# Patient Record
Sex: FEMALE | Race: WHITE | NOT HISPANIC OR LATINO | Employment: FULL TIME | URBAN - METROPOLITAN AREA
[De-identification: names, ages, dates, MRNs, and addresses within clinical notes are randomized per-mention and may not be internally consistent; named-entity substitution may affect disease eponyms.]

---

## 2017-02-23 ENCOUNTER — GENERIC CONVERSION - ENCOUNTER (OUTPATIENT)
Dept: OTHER | Facility: OTHER | Age: 62
End: 2017-02-23

## 2017-05-10 ENCOUNTER — ALLSCRIPTS OFFICE VISIT (OUTPATIENT)
Dept: OTHER | Facility: OTHER | Age: 62
End: 2017-05-10

## 2017-05-10 DIAGNOSIS — R10.13 EPIGASTRIC PAIN: ICD-10-CM

## 2017-05-24 ENCOUNTER — HOSPITAL ENCOUNTER (OUTPATIENT)
Dept: ULTRASOUND IMAGING | Facility: HOSPITAL | Age: 62
Discharge: HOME | End: 2017-05-24
Payer: COMMERCIAL

## 2017-05-24 DIAGNOSIS — R10.13 EPIGASTRIC PAIN: ICD-10-CM

## 2017-05-24 PROCEDURE — 76705 ECHO EXAM OF ABDOMEN: CPT

## 2017-06-06 RX ORDER — FAMOTIDINE 40 MG/1
40 TABLET, FILM COATED ORAL DAILY
COMMUNITY
End: 2018-03-09 | Stop reason: SDUPTHER

## 2017-06-06 RX ORDER — TRAZODONE HYDROCHLORIDE 50 MG/1
50 TABLET ORAL
COMMUNITY
End: 2021-12-13

## 2017-06-07 ENCOUNTER — ANESTHESIA EVENT (OUTPATIENT)
Dept: PERIOP | Facility: HOSPITAL | Age: 62
End: 2017-06-07
Payer: COMMERCIAL

## 2017-06-08 ENCOUNTER — GENERIC CONVERSION - ENCOUNTER (OUTPATIENT)
Dept: OTHER | Facility: OTHER | Age: 62
End: 2017-06-08

## 2017-06-08 ENCOUNTER — ANESTHESIA (OUTPATIENT)
Dept: PERIOP | Facility: HOSPITAL | Age: 62
End: 2017-06-08
Payer: COMMERCIAL

## 2017-06-08 ENCOUNTER — HOSPITAL ENCOUNTER (OUTPATIENT)
Facility: HOSPITAL | Age: 62
Setting detail: OUTPATIENT SURGERY
Discharge: HOME | End: 2017-06-08
Attending: INTERNAL MEDICINE | Admitting: INTERNAL MEDICINE
Payer: COMMERCIAL

## 2017-06-08 VITALS
RESPIRATION RATE: 10 BRPM | OXYGEN SATURATION: 100 % | HEART RATE: 67 BPM | SYSTOLIC BLOOD PRESSURE: 110 MMHG | DIASTOLIC BLOOD PRESSURE: 73 MMHG | TEMPERATURE: 98 F | HEIGHT: 65 IN | BODY MASS INDEX: 23.32 KG/M2 | WEIGHT: 140 LBS

## 2017-06-08 DIAGNOSIS — R10.11 ABDOMINAL PAIN, RUQ: ICD-10-CM

## 2017-06-08 DIAGNOSIS — K21.00 GASTRO-ESOPHAGEAL REFLUX DISEASE WITH ESOPHAGITIS: ICD-10-CM

## 2017-06-08 DIAGNOSIS — R10.13 EPIGASTRIC PAIN: ICD-10-CM

## 2017-06-08 DIAGNOSIS — Z12.11 ENCOUNTER FOR SCREENING FOR MALIGNANT NEOPLASM OF COLON: ICD-10-CM

## 2017-06-08 PROCEDURE — 88342 IMHCHEM/IMCYTCHM 1ST ANTB: CPT | Performed by: INTERNAL MEDICINE

## 2017-06-08 PROCEDURE — 88305 TISSUE EXAM BY PATHOLOGIST: CPT | Performed by: INTERNAL MEDICINE

## 2017-06-08 RX ORDER — SODIUM CHLORIDE, SODIUM LACTATE, POTASSIUM CHLORIDE, CALCIUM CHLORIDE 600; 310; 30; 20 MG/100ML; MG/100ML; MG/100ML; MG/100ML
50 INJECTION, SOLUTION INTRAVENOUS CONTINUOUS
Status: DISCONTINUED | OUTPATIENT
Start: 2017-06-08 | End: 2017-06-08 | Stop reason: HOSPADM

## 2017-06-08 RX ORDER — PROPOFOL 10 MG/ML
INJECTION, EMULSION INTRAVENOUS AS NEEDED
Status: DISCONTINUED | OUTPATIENT
Start: 2017-06-08 | End: 2017-06-08 | Stop reason: SURG

## 2017-06-08 RX ORDER — LIDOCAINE HYDROCHLORIDE 10 MG/ML
INJECTION, SOLUTION INFILTRATION; PERINEURAL AS NEEDED
Status: DISCONTINUED | OUTPATIENT
Start: 2017-06-08 | End: 2017-06-08 | Stop reason: SURG

## 2017-06-08 RX ADMIN — PROPOFOL 50 MG: 10 INJECTION, EMULSION INTRAVENOUS at 08:23

## 2017-06-08 RX ADMIN — PROPOFOL 50 MG: 10 INJECTION, EMULSION INTRAVENOUS at 08:17

## 2017-06-08 RX ADMIN — PROPOFOL 100 MG: 10 INJECTION, EMULSION INTRAVENOUS at 08:08

## 2017-06-08 RX ADMIN — PROPOFOL 50 MG: 10 INJECTION, EMULSION INTRAVENOUS at 08:13

## 2017-06-08 RX ADMIN — SODIUM CHLORIDE, SODIUM LACTATE, POTASSIUM CHLORIDE, AND CALCIUM CHLORIDE 50 ML/HR: .6; .31; .03; .02 INJECTION, SOLUTION INTRAVENOUS at 06:57

## 2017-06-08 RX ADMIN — PROPOFOL 50 MG: 10 INJECTION, EMULSION INTRAVENOUS at 08:20

## 2017-06-08 RX ADMIN — LIDOCAINE HYDROCHLORIDE 50 MG: 10 INJECTION, SOLUTION INFILTRATION; PERINEURAL at 08:07

## 2018-01-12 VITALS
SYSTOLIC BLOOD PRESSURE: 122 MMHG | DIASTOLIC BLOOD PRESSURE: 80 MMHG | BODY MASS INDEX: 24.41 KG/M2 | HEIGHT: 64 IN | WEIGHT: 143 LBS

## 2018-01-12 NOTE — PROGRESS NOTES
Assessment    1  Encounter for other screening for malignant neoplasm of breast (V76 19) (Z12 39)   2  Hyperlipidemia (272 4) (E78 5)   3  Encounter for vitamin deficiency screening (V77 99) (Z13 21)   4  History of Influenza vaccination administered at current visit (V04 81) (Z23)   5  Esophagitis, reflux (530 11) (K21 0)   6  Insomnia (780 52) (G47 00)   7  Osteopenia (733 90) (M85 80)   8  Encounter for preventive health examination (V70 0) (Z00 00)    Plan  Encounter for other screening for malignant neoplasm of breast    · * MAMMO SCREENING BILATERAL W CAD; Status:Active - Retrospective By Protocol  Authorization; Requested for:23Lua4310;   Encounter for vitamin deficiency screening, Osteopenia    · (1) VITAMIN D 25-HYDROXY; Status:Active - Retrospective Authorization; Requested  for:23Guz2704;   Epigastric pain    · Gaviscon  MG/15ML Oral Suspension  Esophagitis, reflux    · Omeprazole 20 MG Oral Capsule Delayed Release  Fatigue    · Zolpidem Tartrate 5 MG Oral Tablet  Health Maintenance    · *VB-Depression Screening; Status:Active; Requested for:13Hte2516;    · Begin or continue regular aerobic exercise  Gradually work up to at least count1  sessions of dur1 of exercise a week ; Status:Complete;   Done: 09YUH9468 10:50PM   · Decreasing the stress in your life may help your condition improve ; Status:Complete;    Done: 54ZJF6057 10:50PM   · Regular aerobic exercise can help reduce stress ; Status:Complete;   Done: 99ISG0984  10:50PM  Hyperlipidemia    · (1) CBC/PLT/DIFF; Status:Active - Retrospective Authorization; Requested  for:82Tqk4208;    · (1) COMPREHENSIVE METABOLIC PANEL; Status:Active - Retrospective Authorization; Requested for:52Wqc4408;    · (1) LIPID PANEL, FASTING; Status:Active - Retrospective Authorization; Requested  for:57Lcb0266;   Hyperlipidemia, Insomnia    · (1) TSH; Status:Active - Retrospective Authorization; Requested for:52Jdb6978;    Insomnia    · TraZODone HCl - 50 MG Oral Tablet; TAKE 1 TABLET AT BEDTIME  Osteopenia, Screening for osteoporosis    · * DXA BONE DENSITY SPINE HIP AND PELVIS; Status:Active - Retrospective  Authorization; Requested for:95Vqv6192;   PMH: Influenza vaccination administered at current visit    · Fluzone Quadrivalent 0 5 ML Intramuscular Suspension Prefilled Syringe  Unlinked    · Co-Enzyme Q10 200 MG Oral Capsule   · Vitamin D3 400 UNIT Oral Capsule    Check BW, mammogram, Dexa scan  Rx for trazodone  Recheck 6 weeks  Discussion/Summary    1  Esophageal reflux-f/u with GI for EGD  2  insomnia-chronic-should have a sleep study  Will give trial of trazodone first  3  Depression-mild-may be related to her poor sleep  Will see how she responds  May need an SSRI  4  HM-shots up to date  Needs mammogram, Dexa scan, Gyn exam  Will be getting colonoscopy  History of Present Illness  HM, Adult Female: The patient is being seen for a health maintenance evaluation  General Health: The patient's health since the last visit is described as good  Lifestyle:  She consumes a diverse and healthy diet  (Eats fruits and vegetables  Eats fish, chicken  Avoids red meat  Does a Nutribullet with a spinach and fruits  )   She exercises regularly  (Walks the dogs  Very active at work)   She does not use tobacco  She consumes alcohol  She reports occasional alcohol use  Caffeine 3 daily  No soda or tea  Dietary calcium 1 daily  Screening: Additional History:   Gyn checkup in Jan    HPI: Namrata Moss says she is doing well overall  She notices some urinary incontinence  Seems mostly related to cough, sneeze  She has had bad reflux   Saw GI   See consult  EGD/colonoscopy recommended  Her sleep is poor and has been for years  She has trouble getting to sleep and staying asleep  She does snore  No history of apnea  Review of Systems    Over the past 2 weeks, how often have you been bothered by the following problems? 1 ) Little interest or pleasure in doing things? Not at all    2 ) Feeling down, depressed or hopeless? Not at all    3 ) Trouble falling asleep or sleeping too much? Nearly every day  4 ) Feeling tired or having little energy? Nearly every day    7 ) Trouble concentrating on things, such as reading a newspaper or watching television? Several days  8 ) Moving or speaking so slowly that other people could have noticed, or the opposite, moving or speaking faster than usual? Not at all    9 ) Thoughts that you would be better off dead or of hurting yourself in some way? Not at all  severity of depression is mild       Active Problems    1  Abdominal pain, RUQ (789 01) (R10 11)   2  Achrochordon (701 9) (L91 8)   3  Encounter for screening mammogram for malignant neoplasm of breast (V76 12)   (Z12 31)   4  Epigastric pain (789 06) (R10 13)   5  Esophagitis, reflux (530 11) (K21 0)   6  Fatigue (780 79) (R53 83)   7  Hyperlipidemia (272 4) (E78 5)   8  Insomnia (780 52) (G47 00)   9  Insomnia (780 52) (G47 00)   10  Lump of skin (782 2) (R22 9)   11  Osteopenia (733 90) (M85 80)   12  Palpitations (785 1) (R00 2)   13  Regurgitation (787 03) (R11 10)   14  Screening for colorectal cancer (V76 51) (Z12 11,Z12 12)   15  Screening for osteoporosis (V82 81) (Z13 820)   16  Screening for skin condition (V82 0) (Z13 89)   17  Skin rash (782 1) (R21)   18   Urinary incontinence (788 30) (R32)    Past Medical History    · History of Influenza vaccination administered at current visit (V04 81) (Z23)    Surgical History    · History of Breast Surgery Lumpectomy   · History of Colonoscopy (Fiberoptic)    Family History  Mother    · Family history of Family Health Status Of Mother - Alive   · Family history of Parkinson's disease (V17 2) (Z82 0)   · Family history of Hypertension (V17 49)  Father    · Family history of Diabetes Mellitus (V18 0)   · Family history of Family Health Status Of Father - Alive   · Family history of malignant neoplasm of urinary bladder (V16 52) (Z80 52)   · Family history of Hypertension (V17 49)    Social History    · Alcohol Use (History)   · Caffeine Use   · Never a smoker    Current Meds   1  Co-Enzyme Q10 200 MG Oral Capsule; Therapy: 59NCA0470 to Recorded   2  Famotidine 40 MG Oral Tablet; TAKE 1 TABLET AT BEDTIME; Therapy: 18Ixq2874 to (Evaluate:08Jan2017)  Requested for: 84Fwe3418; Last   Rx:31Yje6254 Ordered   3  Gaviscon  MG/15ML Oral Suspension; TAKE 10 ML 4 times daily; Therapy: 56Qsn8777 to (Papito Li)  Requested for: 21Apr2016; Last   Rx:87Yna3600 Ordered   4  Nighttime Sleep Aid 25 MG Oral Tablet; TAKE 1 TABLET AT BEDTIME Recorded   5  Omeprazole 20 MG Oral Capsule Delayed Release; TAKE ONE CAPSULE BY MOUTH   EVERY DAY; Therapy: 67VSC1165 to (Rayna Kothari)  Requested for: 90GUT9810; Last   GJ:93MNP7244 Ordered   6  Vitamin D3 400 UNIT Oral Capsule; Therapy: 77CKP2007 to Recorded   7  Zolpidem Tartrate 5 MG Oral Tablet; TAKE 1 TABLET AT  BEDTIME AS NEEDED FOR   INSOMNIA; Therapy: 92HLF0623 to (Evaluate:22Tpu4532); Last Rx:06Ivd1130 Ordered    Allergies    1  Penicillins    Vitals   Recorded: 99Dcz3629 09:30AM   Heart Rate 86   Respiration 16   Systolic 381   Diastolic 70   Height 5 ft 4 8 in   Weight 141 lb    BMI Calculated 23 61   BSA Calculated 1 71     Physical Exam    Constitutional   General appearance: No acute distress, well appearing and well nourished  Head and Face   Head and face: Normal     Eyes   Conjunctiva and lids: No swelling, erythema or discharge  Pupils and irises: Equal, round, reactive to light  Ears, Nose, Mouth, and Throat   Otoscopic examination: Tympanic membranes translucent with normal light reflex  Canals patent without erythema  Oropharynx: Normal with no erythema, edema, exudate or lesions  Neck   Neck: Supple, symmetric, trachea midline, no masses  Thyroid: Normal, no thyromegaly      Pulmonary   Respiratory effort: No increased work of breathing or signs of respiratory distress  Auscultation of lungs: Clear to auscultation  Cardiovascular   Auscultation of heart: Normal rate and rhythm, normal S1 and S2, no murmurs  Carotid pulses: 2+ bilaterally  Abdominal aorta: Normal     Femoral pulses: 2+ bilaterally  Pedal pulses: 2+ bilaterally  Peripheral vascular exam: Normal     Examination of extremities for edema and/or varicosities: Normal     Abdomen   Abdomen: Non-tender, no masses  Liver and spleen: No hepatomegaly or splenomegaly  Musculoskeletal   Gait and station: Normal     Psychiatric   Mood and affect: Normal        Health Management  Encounter for screening mammogram for malignant neoplasm of breast   Digital Bilateral Screening Mammogram With CAD; every 1 year; Last 59LMK0964; Next  Due: 42QGM7202; Overdue  Screening for osteoporosis   * Dexa Scan Axial Skel (Hip, Pelvis, Spine); every 2 years; Last 15TNA2876; Next Due:  81WZO2673; Overdue    Future Appointments    Date/Time Provider Specialty Site   02/21/2017 08:30 AM RNEÉE Nagel   [address] [place] [address]     Signatures   Electronically signed by : RENÉE Tapia ; Dec 22 2016 10:51PM EST                       (Author)

## 2018-02-24 ENCOUNTER — OFFICE VISIT (OUTPATIENT)
Dept: URGENT CARE | Facility: CLINIC | Age: 63
End: 2018-02-24
Payer: COMMERCIAL

## 2018-02-24 ENCOUNTER — APPOINTMENT (OUTPATIENT)
Dept: RADIOLOGY | Facility: CLINIC | Age: 63
End: 2018-02-24
Payer: COMMERCIAL

## 2018-02-24 VITALS
WEIGHT: 147 LBS | RESPIRATION RATE: 16 BRPM | HEIGHT: 65 IN | OXYGEN SATURATION: 96 % | HEART RATE: 84 BPM | DIASTOLIC BLOOD PRESSURE: 70 MMHG | TEMPERATURE: 97.3 F | SYSTOLIC BLOOD PRESSURE: 106 MMHG | BODY MASS INDEX: 24.49 KG/M2

## 2018-02-24 DIAGNOSIS — M54.50 ACUTE RIGHT-SIDED LOW BACK PAIN WITHOUT SCIATICA: Primary | ICD-10-CM

## 2018-02-24 DIAGNOSIS — S39.92XA INJURY OF LOW BACK, INITIAL ENCOUNTER: ICD-10-CM

## 2018-02-24 PROCEDURE — 72100 X-RAY EXAM L-S SPINE 2/3 VWS: CPT

## 2018-02-24 PROCEDURE — 99203 OFFICE O/P NEW LOW 30 MIN: CPT

## 2018-02-24 RX ORDER — METAXALONE 800 MG/1
800 TABLET ORAL 3 TIMES DAILY
Qty: 12 TABLET | Refills: 0 | Status: SHIPPED | OUTPATIENT
Start: 2018-02-24 | End: 2018-10-08 | Stop reason: ALTCHOICE

## 2018-02-24 NOTE — PATIENT INSTRUCTIONS
Begin muscle relaxant  Do not take prior to driving, operating machinery, or where you are expected to be alert  Ice and heat for 20-30 minutes at a time, 3-4 times per day  Stretching exercises as reviewed  Do not immobilize self as this will cause worsening of symptoms  Keep follow up appointment for Tuesday with your family doctor  If symptoms worsen or if not resolving, call your family doctor or go to the Er  If you develop numbness, tingling, weakness, incontinence, or belly pain, go to the Er  Acute Low Back Pain   AMBULATORY CARE:   Acute low back pain  is sudden discomfort in your lower back area that lasts for up to 6 weeks  The discomfort makes it difficult to tolerate activity  Common symptoms include the following:   · Back stiffness or spasms    · Pain down the back or side of one leg    · Holding yourself in an unusual position or posture to decrease your back pain    · Not being able to find a sitting position that is comfortable    · Slow increase in your pain for 24 to 48 hours after you stress your back    · Tenderness on your lower back or severe pain when you move your back  Seek immediate care for the following symptoms:   · Severe pain    · Sudden stiffness and heaviness in both buttocks down to both legs    · Numbness or weakness in one leg, or pain in both legs    · Numbness in your genital area or across your lower back    · Unable to control your urine or bowel movements  Contact your healthcare provider if:   · You have a fever  · You have pain at night or when you rest     · Your pain does not get better with treatment  · You have pain that worsens when you cough or sneeze  · You suddenly feel something pop or snap in your back  · You have questions or concerns about your condition or care  The goal of treatment for acute low back pain  is to relieve your pain and help you tolerate activity  Most people with acute lower back pain get better within 4 to 6 weeks   You may need any of the following:  · Acetaminophen  decreases pain  It is available without a doctor's order  Ask how much to take and how often to take it  Follow directions  Acetaminophen can cause liver damage if not taken correctly  · NSAIDs  help decrease swelling and pain  This medicine is available with or without a doctor's order  NSAIDs can cause stomach bleeding or kidney problems in certain people  If you take blood thinner medicine, always ask your healthcare provider if NSAIDs are safe for you  Always read the medicine label and follow directions  · Prescription pain medicine  may be given  Ask your healthcare provider how to take this medicine safely  · Muscle relaxers  decrease pain by relaxing the muscles in your lower spine  Manage your symptoms:   · Stay active  as much as you can without causing more pain  Bed rest could make your back pain worse  Start with some light exercises such as walking  Avoid heavy lifting until your pain is gone  Ask for more information about the activities or exercises that are right for you  · Ice  helps decrease swelling, pain, and muscle spams  Put crushed ice in a plastic bag  Cover it with a towel  Place it on your lower back for 20 to 30 minutes every 2 hours  Do this for about 2 to 3 days after your pain starts, or as directed  · Heat  helps decrease pain and muscle spasms  Start to use heat after treatment with ice has stopped  Use a small towel dampened with warm water or a heating pad, or sit in a warm bath  Apply heat on the area for 20 to 30 minutes every 2 hours for as many days as directed  Alternate heat and ice  Prevent acute low back pain:   · Use proper body mechanics  ¨ Bend at the hips and knees when you  objects  Do not bend from the waist  Use your leg muscles as you lift the load  Do not use your back  Keep the object close to your chest as you lift it   Try not to twist or lift anything above your waist     ¨ Change your position often when you stand for long periods of time  Rest one foot on a small box or footrest, and then switch to the other foot often  ¨ Try not to sit for long periods of time  When you do, sit in a straight-backed chair with your feet flat on the floor  Never reach, pull, or push while you are sitting  · Do exercises that strengthen your back muscles  Warm up before you exercise  Ask your healthcare provider the best exercises for you  · Maintain a healthy weight  Ask your healthcare provider how much you should weigh  Ask him to help you create a weight loss plan if you are overweight  Follow up with your healthcare provider as directed:  Return for a follow-up visit if you still have pain after 1 to 3 weeks of treatment  You may need to visit an orthopedist if your back pain lasts more than 12 weeks  Write down your questions so you remember to ask them during your visits  © 2017 [employer] Information is for End User's use only and may not be sold, redistributed or otherwise used for commercial purposes  All illustrations and images included in CareNotes® are the copyrighted property of A D [employer] M [employer] Inc  or [employer]  The above information is an  only  It is not intended as medical advice for individual conditions or treatments  Talk to your doctor, nurse or pharmacist before following any medical regimen to see if it is safe and effective for you

## 2018-02-24 NOTE — PROGRESS NOTES
[facility]        NAME: Nayeli Barahona is a 65 y o  female  : 1955    MRN: [id number]  DATE: 2018  TIME: 12:26 PM    Assessment and Plan   Acute right-sided low back pain without sciatica [M54 5]  1  Acute right-sided low back pain without sciatica  XR spine lumbar 2 or 3 views injury    metaxalone (SKELAXIN) 800 mg tablet   2  Injury of low back, initial encounter  XR spine lumbar 2 or 3 views injury    metaxalone (SKELAXIN) 800 mg tablet         Patient Instructions   Begin muscle relaxant  Do not take prior to driving, operating machinery, or where you are expected to be alert  Ice and heat for 20-30 minutes at a time, 3-4 times per day  Stretching exercises as reviewed  Do not fully immobilize self as this will cause worsening of symptoms  Keep follow up appointment for Tuesday with your family doctor  Go to the ER with red flag symptoms/signs  Follow up with PCP in 3-5 days  Proceed to  ER if symptoms worsen  All questions answered  Precautions given  Advised patient not to take muscle relaxant with any depressants or sleep aids including current rxs  Advised no work until reevaluation and d/c of muscle relaxant  Chief Complaint     Chief Complaint   Patient presents with    Back Pain     x 6 days         History of Present Illness     64 y/o female with c/o of right sided lower back pain x 6 days  Was shoveling last weekend and felt a snap in lower right back  Pain is constant, sharp, nonradiating, located solely on the right side  No vertebral tenderness  Multiple back injuries in the past  Saw her chiropractor for pain 4 days following injury, who did a manipulation and electro stimulus  Chiropractor gave her a brace for discomfort, with not resultant relief  Felt worse after manipulation  Worse walking, sitting up from supine position, and with bending and rotational movement  Better with leaning forward   No weakness, tingling, numbness, incontinence of bowel or bladder, or abdominal pain  Has appt with family doctor in 3 days  Treating with advil, aleve, Doans, and icy hot with no relief  Works at [employer] and hasn't been able to go to work due to pain  Review of Systems   Review of Systems   Constitutional: Negative for chills and fever  Gastrointestinal: Negative for abdominal pain, diarrhea, nausea and vomiting  Skin: Negative for rash  Current Medications     Current Outpatient Prescriptions:     Doxylamine Succinate, Sleep, (SLEEP AID PO), Take by mouth, Disp: , Rfl:     famotidine (PEPCID) 40 MG tablet, Take 40 mg by mouth daily, Disp: , Rfl:     metaxalone (SKELAXIN) 800 mg tablet, Take 1 tablet (800 mg total) by mouth 3 (three) times a day, Disp: 12 tablet, Rfl: 0    traZODone (DESYREL) 50 mg tablet, Take 50 mg by mouth daily at bedtime, Disp: , Rfl:   Current Allergies     Allergies as of 02/24/2018 - Reviewed 02/24/2018   Allergen Reaction Noted    Penicillins  06/06/2017          The following portions of the patient's history were reviewed and updated as appropriate: allergies, current medications, past family history, past medical history, past social history, past surgical history and problem list      Objective   /70 (BP Location: Left arm, Patient Position: Sitting, Cuff Size: Standard)   Pulse 84   Temp (!) 97 3 °F (36 3 °C) (Tympanic)   Resp 16   Ht 5' 5" (1 651 m)   Wt 66 7 kg (147 lb)   SpO2 96%   BMI 24 46 kg/m²      Radiology:  No acute findings on xray  Physical Exam     Physical Exam   Constitutional: She is oriented to person, place, and time  She appears well-developed and well-nourished  No distress  Eyes: Conjunctivae are normal    Cardiovascular: Normal rate, regular rhythm and normal heart sounds  Pulmonary/Chest: Effort normal and breath sounds normal  No respiratory distress  She has no decreased breath sounds  She has no wheezes  She has no rhonchi  She has no rales  Abdominal: Soft   She exhibits no distension  There is no tenderness  Musculoskeletal:        Back:    Neurological: She is alert and oriented to person, place, and time  She has normal reflexes  No cranial nerve deficit  Skin: Skin is warm and dry  Psychiatric: She has a normal mood and affect  Her behavior is normal    Nursing note and vitals reviewed

## 2018-02-24 NOTE — PROGRESS NOTES
Pt  States that she was sholving snow on Sunday and felt a pop in her back, had some pain in the lower back area and took aleve and continued  Pt  States that the pain got worse and she saw her chiropractor and he did and adjustment and gave pt  A brace, however the pain is getting worse  Pt  States that she can't sleep because she can not turn over, also can not move like she used to  Pt  States that she tried aleve and advil

## 2018-02-28 ENCOUNTER — OFFICE VISIT (OUTPATIENT)
Dept: FAMILY MEDICINE CLINIC | Facility: MEDICAL CENTER | Age: 63
End: 2018-02-28
Payer: COMMERCIAL

## 2018-02-28 VITALS
BODY MASS INDEX: 25.06 KG/M2 | SYSTOLIC BLOOD PRESSURE: 138 MMHG | RESPIRATION RATE: 16 BRPM | HEART RATE: 68 BPM | DIASTOLIC BLOOD PRESSURE: 76 MMHG | WEIGHT: 150.6 LBS

## 2018-02-28 DIAGNOSIS — M54.50 ACUTE RIGHT-SIDED LOW BACK PAIN WITHOUT SCIATICA: ICD-10-CM

## 2018-02-28 DIAGNOSIS — R42 VERTIGO: Primary | ICD-10-CM

## 2018-02-28 DIAGNOSIS — H91.91 HEARING LOSS OF RIGHT EAR, UNSPECIFIED HEARING LOSS TYPE: ICD-10-CM

## 2018-02-28 PROCEDURE — 99213 OFFICE O/P EST LOW 20 MIN: CPT | Performed by: FAMILY MEDICINE

## 2018-02-28 RX ORDER — METHYLPREDNISOLONE 4 MG/1
TABLET ORAL
Qty: 21 TABLET | Refills: 0 | Status: SHIPPED | OUTPATIENT
Start: 2018-02-28 | End: 2018-10-08 | Stop reason: ALTCHOICE

## 2018-02-28 RX ORDER — MELOXICAM 15 MG/1
15 TABLET ORAL DAILY
Qty: 30 TABLET | Refills: 0 | Status: SHIPPED | OUTPATIENT
Start: 2018-02-28 | End: 2018-10-08 | Stop reason: ALTCHOICE

## 2018-02-28 NOTE — PROGRESS NOTES
Aundrea Pollard is here for f/u of back pain  Pain started while shoveling 10 days ago  Pain was in right buttock  No radiation  Seen at Urgent Care  XRAys done showed multilevel degenerative disc disease  Taking Skelaxin but causes palpitations ? Alleve asia't help  X-rays done by urgent care showed degenerative disc disease  She gets episodes of dizziness over the past 6 months  Occurs when she lays down on her right side  Can also occur when she gets up  Spinning sensation  Daily  Thinks there is some hearing  loss on the right as well  No tinnitus  No allergy symptoms  O: /76 (BP Location: Left arm, Patient Position: Sitting, Cuff Size: Adult)   Pulse 68   Resp 16   Wt 68 3 kg (150 lb 9 6 oz)   BMI 25 06 kg/m²    Some discomfort was getting on the exam table  Back is nontender  No spinal tenderness  Negative straight leg raising  Reflexes and motor exam lower extremities are normal   HEENT-PEERL  EOMI  Eyes clear  Ears-Canals and TM's normal      Assessment:  1  Acute low back pain-probable strain; advise continued use of heat  Will give a trial of steroids and start Mobic  2   Vertigo-most likely BPPV however since she has some hearing loss will refer to ENT    Plan  Rx for Medrol Dosepak and Mobic  ENT referral   Estrella Dinh if no better

## 2018-03-08 ENCOUNTER — TRANSCRIBE ORDERS (OUTPATIENT)
Dept: ADMINISTRATIVE | Age: 63
End: 2018-03-08

## 2018-03-09 DIAGNOSIS — K21.9 GASTROESOPHAGEAL REFLUX DISEASE, ESOPHAGITIS PRESENCE NOT SPECIFIED: Primary | ICD-10-CM

## 2018-03-09 RX ORDER — FAMOTIDINE 40 MG/1
TABLET, FILM COATED ORAL
Qty: 90 TABLET | Refills: 2 | Status: SHIPPED | OUTPATIENT
Start: 2018-03-09 | End: 2018-12-21 | Stop reason: SDUPTHER

## 2018-10-08 ENCOUNTER — OFFICE VISIT (OUTPATIENT)
Dept: FAMILY MEDICINE CLINIC | Facility: MEDICAL CENTER | Age: 63
End: 2018-10-08
Payer: COMMERCIAL

## 2018-10-08 VITALS
BODY MASS INDEX: 24.76 KG/M2 | HEART RATE: 86 BPM | WEIGHT: 148.8 LBS | OXYGEN SATURATION: 98 % | SYSTOLIC BLOOD PRESSURE: 120 MMHG | DIASTOLIC BLOOD PRESSURE: 84 MMHG

## 2018-10-08 DIAGNOSIS — Z13.820 SCREENING FOR OSTEOPOROSIS: ICD-10-CM

## 2018-10-08 DIAGNOSIS — Z12.39 SCREENING FOR BREAST CANCER: ICD-10-CM

## 2018-10-08 DIAGNOSIS — Z23 NEED FOR SHINGLES VACCINE: ICD-10-CM

## 2018-10-08 DIAGNOSIS — R00.2 PALPITATIONS: ICD-10-CM

## 2018-10-08 DIAGNOSIS — F51.01 PRIMARY INSOMNIA: ICD-10-CM

## 2018-10-08 DIAGNOSIS — Z12.39 ENCOUNTER FOR SCREENING FOR MALIGNANT NEOPLASM OF BREAST: ICD-10-CM

## 2018-10-08 DIAGNOSIS — Z00.00 ROUTINE ADULT HEALTH MAINTENANCE: Primary | ICD-10-CM

## 2018-10-08 PROCEDURE — 99396 PREV VISIT EST AGE 40-64: CPT | Performed by: FAMILY MEDICINE

## 2018-10-08 PROCEDURE — 93000 ELECTROCARDIOGRAM COMPLETE: CPT | Performed by: FAMILY MEDICINE

## 2018-10-08 NOTE — PROGRESS NOTES
Larry Corea is here for CPX  Medical history unchanged  HH: No regular exercise  But 2 very active jobs   Diet is good Eats fruits and vegetables, fish, salads  Dietary calcium 1-2 daily  Caffeine 1 cup daily  Alcohol 1 wine daily  Nonsmoker  FH: Father  from CHF  Mother with Parkinsons disease  Colon cancer father in his [de-identified]  SH: Lives with partner  Works 2 jobs at [employer] and [employer]  Gyn checkup due   Colonoscopy 2017  10 year f/u  Dexa scan 2017  Eye checkup due    I have reviewed the patient's medical history in detail and updated the computerized patient record  Review of Systems   Respiratory: Negative for cough and shortness of breath  Cardiovascular: Positive for chest pain and palpitations  Gets occ chest pain   Sharp pain ; lasts about 5 minutes  Occurs at rest   Not exertional Does not radiate  No heartburn  Can go weeks without it  Gets occ episodes of tachyardia ; can last a few minutes  Not exertional     Can slow it down  Occ skipped  Gastrointestinal: Negative for abdominal pain  Genitourinary: Negative for difficulty urinating and dysuria  Musculoskeletal: Positive for arthralgias  Both hips can hurt; thinks related to her job   Skin:        Mole on her back  Derm says it was ok  Psychiatric/Behavioral: Positive for sleep disturbance  She still sleeps poorly  This was discussed last health maintenance visit  Sleep study was advised but did not do  Still has difficulty getting sleep and wakes frequently  O: /84 (BP Location: Left arm, Patient Position: Sitting, Cuff Size: Adult)   Pulse 86   Wt 67 5 kg (148 lb 12 8 oz)   SpO2 98%   BMI 24 76 kg/m²   Physical Exam   Constitutional: She is oriented to person, place, and time  She appears well-developed and well-nourished  HENT:   Head: Normocephalic     Right Ear: External ear normal    Left Ear: External ear normal    Nose: Nose normal    Mouth/Throat: Oropharynx is clear and moist  Eyes: Pupils are equal, round, and reactive to light  Conjunctivae and EOM are normal  No scleral icterus  Neck: Normal range of motion  Neck supple  Carotid bruit is not present  No thyroid mass and no thyromegaly present  Cardiovascular: Normal rate, regular rhythm, normal heart sounds and intact distal pulses  Pulses:       Femoral pulses are 2+ on the right side, and 2+ on the left side  Popliteal pulses are 2+ on the right side, and 2+ on the left side  Dorsalis pedis pulses are 2+ on the right side, and 2+ on the left side  Posterior tibial pulses are 2+ on the right side, and 2+ on the left side  Pulmonary/Chest: Effort normal and breath sounds normal  No respiratory distress  She has no wheezes  She has no rales  She exhibits no tenderness  Abdominal: Soft  Normal aorta and bowel sounds are normal  She exhibits no distension and no mass  There is no hepatosplenomegaly  There is no tenderness  Musculoskeletal: Normal range of motion  She exhibits no edema  Lymphadenopathy:        Head (right side): No submandibular and no occipital adenopathy present  Head (left side): No submandibular and no occipital adenopathy present  She has no cervical adenopathy  Right: No inguinal and no supraclavicular adenopathy present  Left: No inguinal and no supraclavicular adenopathy present  Neurological: She is oriented to person, place, and time  Skin: No lesion and no rash noted  Uniform brown nevus approximately 6 millimeters with scaly features for back   Psychiatric: She has a normal mood and affect  Her behavior is normal      Assessment  1  Health maintenance-mammogram, DEXA scan in 2019  Updated immunizations  Advised Shingrix  Light of her family history of colon cancer I did advise she consider colonoscopy in 5 years instead of 10 years as advised by GI  2   Chest pain-will check stress test   3   Insomnia-still should consider sleep study  4   Will upper back-appears benign but advised follow-up Dermatology    Plan  Check blood work, mammogram, DEXA scan  Shingles vaccine  Sleep consult    Stress test    Follow up with Dermatology

## 2018-12-21 DIAGNOSIS — K21.9 GASTROESOPHAGEAL REFLUX DISEASE, ESOPHAGITIS PRESENCE NOT SPECIFIED: ICD-10-CM

## 2018-12-21 RX ORDER — FAMOTIDINE 40 MG/1
TABLET, FILM COATED ORAL
Qty: 90 TABLET | Refills: 2 | Status: SHIPPED | OUTPATIENT
Start: 2018-12-21 | End: 2022-03-28 | Stop reason: ALTCHOICE

## 2019-03-19 DIAGNOSIS — Z12.39 SCREENING FOR BREAST CANCER: ICD-10-CM

## 2019-03-25 ENCOUNTER — TELEPHONE (OUTPATIENT)
Dept: FAMILY MEDICINE CLINIC | Facility: MEDICAL CENTER | Age: 64
End: 2019-03-25

## 2019-03-25 NOTE — TELEPHONE ENCOUNTER
----- Message from Isaias Davila MD sent at 3/23/2019 10:21 PM EDT -----  Notify mammogram normal  Repeat 1 year

## 2019-09-30 DIAGNOSIS — K21.9 GASTROESOPHAGEAL REFLUX DISEASE, ESOPHAGITIS PRESENCE NOT SPECIFIED: ICD-10-CM

## 2019-09-30 RX ORDER — FAMOTIDINE 40 MG/1
TABLET, FILM COATED ORAL
Qty: 90 TABLET | Refills: 2 | OUTPATIENT
Start: 2019-09-30

## 2019-12-17 ENCOUNTER — OFFICE VISIT (OUTPATIENT)
Dept: FAMILY MEDICINE CLINIC | Facility: MEDICAL CENTER | Age: 64
End: 2019-12-17
Payer: COMMERCIAL

## 2019-12-17 ENCOUNTER — APPOINTMENT (OUTPATIENT)
Dept: LAB | Facility: MEDICAL CENTER | Age: 64
End: 2019-12-17
Payer: COMMERCIAL

## 2019-12-17 VITALS
WEIGHT: 147.13 LBS | RESPIRATION RATE: 14 BRPM | HEART RATE: 88 BPM | SYSTOLIC BLOOD PRESSURE: 110 MMHG | DIASTOLIC BLOOD PRESSURE: 80 MMHG | BODY MASS INDEX: 25.12 KG/M2 | HEIGHT: 64 IN

## 2019-12-17 DIAGNOSIS — Z13.820 SCREENING FOR OSTEOPOROSIS: ICD-10-CM

## 2019-12-17 DIAGNOSIS — R53.83 OTHER FATIGUE: ICD-10-CM

## 2019-12-17 DIAGNOSIS — F51.01 PRIMARY INSOMNIA: ICD-10-CM

## 2019-12-17 DIAGNOSIS — Z00.00 ROUTINE ADULT HEALTH MAINTENANCE: ICD-10-CM

## 2019-12-17 DIAGNOSIS — R06.83 SNORING: ICD-10-CM

## 2019-12-17 DIAGNOSIS — M25.551 PAIN OF BOTH HIP JOINTS: ICD-10-CM

## 2019-12-17 DIAGNOSIS — Z13.29 THYROID DISORDER SCREEN: ICD-10-CM

## 2019-12-17 DIAGNOSIS — M25.552 PAIN OF BOTH HIP JOINTS: ICD-10-CM

## 2019-12-17 DIAGNOSIS — Z23 FLU VACCINE NEED: Primary | ICD-10-CM

## 2019-12-17 LAB
25(OH)D3 SERPL-MCNC: 32.2 NG/ML (ref 30–100)
ALBUMIN SERPL BCP-MCNC: 4.1 G/DL (ref 3.5–5)
ALP SERPL-CCNC: 78 U/L (ref 46–116)
ALT SERPL W P-5'-P-CCNC: 18 U/L (ref 12–78)
ANION GAP SERPL CALCULATED.3IONS-SCNC: 4 MMOL/L (ref 4–13)
AST SERPL W P-5'-P-CCNC: 20 U/L (ref 5–45)
BASOPHILS # BLD AUTO: 0.08 THOUSANDS/ΜL (ref 0–0.1)
BASOPHILS NFR BLD AUTO: 2 % (ref 0–1)
BILIRUB SERPL-MCNC: 0.51 MG/DL (ref 0.2–1)
BUN SERPL-MCNC: 14 MG/DL (ref 5–25)
CALCIUM SERPL-MCNC: 9.3 MG/DL (ref 8.3–10.1)
CHLORIDE SERPL-SCNC: 104 MMOL/L (ref 100–108)
CHOLEST SERPL-MCNC: 266 MG/DL (ref 50–200)
CO2 SERPL-SCNC: 32 MMOL/L (ref 21–32)
CREAT SERPL-MCNC: 0.69 MG/DL (ref 0.6–1.3)
EOSINOPHIL # BLD AUTO: 0.24 THOUSAND/ΜL (ref 0–0.61)
EOSINOPHIL NFR BLD AUTO: 5 % (ref 0–6)
ERYTHROCYTE [DISTWIDTH] IN BLOOD BY AUTOMATED COUNT: 13 % (ref 11.6–15.1)
GFR SERPL CREATININE-BSD FRML MDRD: 93 ML/MIN/1.73SQ M
GLUCOSE P FAST SERPL-MCNC: 89 MG/DL (ref 65–99)
HCT VFR BLD AUTO: 43.8 % (ref 34.8–46.1)
HDLC SERPL-MCNC: 101 MG/DL
HGB BLD-MCNC: 14.2 G/DL (ref 11.5–15.4)
IMM GRANULOCYTES # BLD AUTO: 0.02 THOUSAND/UL (ref 0–0.2)
IMM GRANULOCYTES NFR BLD AUTO: 0 % (ref 0–2)
LDLC SERPL CALC-MCNC: 155 MG/DL (ref 0–100)
LYMPHOCYTES # BLD AUTO: 1.1 THOUSANDS/ΜL (ref 0.6–4.47)
LYMPHOCYTES NFR BLD AUTO: 21 % (ref 14–44)
MCH RBC QN AUTO: 31.6 PG (ref 26.8–34.3)
MCHC RBC AUTO-ENTMCNC: 32.4 G/DL (ref 31.4–37.4)
MCV RBC AUTO: 98 FL (ref 82–98)
MONOCYTES # BLD AUTO: 0.36 THOUSAND/ΜL (ref 0.17–1.22)
MONOCYTES NFR BLD AUTO: 7 % (ref 4–12)
NEUTROPHILS # BLD AUTO: 3.47 THOUSANDS/ΜL (ref 1.85–7.62)
NEUTS SEG NFR BLD AUTO: 65 % (ref 43–75)
NONHDLC SERPL-MCNC: 165 MG/DL
NRBC BLD AUTO-RTO: 0 /100 WBCS
PLATELET # BLD AUTO: 307 THOUSANDS/UL (ref 149–390)
PMV BLD AUTO: 10 FL (ref 8.9–12.7)
POTASSIUM SERPL-SCNC: 3.7 MMOL/L (ref 3.5–5.3)
PROT SERPL-MCNC: 7.7 G/DL (ref 6.4–8.2)
RBC # BLD AUTO: 4.49 MILLION/UL (ref 3.81–5.12)
SODIUM SERPL-SCNC: 140 MMOL/L (ref 136–145)
TRIGL SERPL-MCNC: 48 MG/DL
TSH SERPL DL<=0.05 MIU/L-ACNC: 0.9 UIU/ML (ref 0.36–3.74)
WBC # BLD AUTO: 5.27 THOUSAND/UL (ref 4.31–10.16)

## 2019-12-17 PROCEDURE — 84443 ASSAY THYROID STIM HORMONE: CPT | Performed by: FAMILY MEDICINE

## 2019-12-17 PROCEDURE — 99396 PREV VISIT EST AGE 40-64: CPT

## 2019-12-17 PROCEDURE — 36415 COLL VENOUS BLD VENIPUNCTURE: CPT | Performed by: FAMILY MEDICINE

## 2019-12-17 PROCEDURE — 80053 COMPREHEN METABOLIC PANEL: CPT | Performed by: FAMILY MEDICINE

## 2019-12-17 PROCEDURE — 90471 IMMUNIZATION ADMIN: CPT

## 2019-12-17 PROCEDURE — 90682 RIV4 VACC RECOMBINANT DNA IM: CPT

## 2019-12-17 PROCEDURE — 85025 COMPLETE CBC W/AUTO DIFF WBC: CPT | Performed by: FAMILY MEDICINE

## 2019-12-17 PROCEDURE — 82306 VITAMIN D 25 HYDROXY: CPT | Performed by: FAMILY MEDICINE

## 2019-12-17 PROCEDURE — 80061 LIPID PANEL: CPT | Performed by: FAMILY MEDICINE

## 2019-12-17 RX ORDER — SACCHAROMYCES BOULARDII 250 MG
250 CAPSULE ORAL DAILY
COMMUNITY
End: 2020-02-12

## 2019-12-17 RX ORDER — PROPRANOLOL/HYDROCHLOROTHIAZID 40 MG-25MG
TABLET ORAL
COMMUNITY

## 2019-12-17 NOTE — PROGRESS NOTES
BMI Counseling: Body mass index is 25 06 kg/m²  The BMI is above normal  Nutrition recommendations include encouraging healthy choices of fruits and vegetables  Exercise recommendations include exercising 3-5 times per week  No pharmacotherapy was ordered  Magda Goodson is here for CPX  HH: Diet is good  Eatsf fruits, vegetables  Fish at least once a week  Caffeine 1 daily  Alcohol 1 daily  Some dairy  FH: 2 sisters in good health  No cancer , diabetes  SH: Works 2 jobs  [email or web address]  Lives with partner  She complains of pain in both hips for the past year  Wakes from sleep  Can't lay on either side  Doesn't take anything for it  She complained of the similar symptoms last year  She still has difficulty sleeping with night waking and snoring  Sees Dermatology annually  [facility] D[facility]at[facility] in [facility]  Review of Systems   Respiratory: Negative for chest tightness and shortness of breath  Cardiovascular: Negative for chest pain, palpitations and leg swelling  Gastrointestinal: Negative for abdominal pain and blood in stool  Skin: Negative for rash  Neurological: Negative for dizziness and syncope  Gyn checkup 2019  Mammogram 2019  DEXA scan 2017    O: /80   Pulse 88   Resp 14   Ht 5' 4 25" (1 632 m)   Wt 66 7 kg (147 lb 2 oz)   BMI 25 06 kg/m²   Physical Exam   Constitutional: She is oriented to person, place, and time  She appears well-developed and well-nourished  HENT:   Head: Normocephalic  Right Ear: External ear normal    Left Ear: External ear normal    Nose: Nose normal    Mouth/Throat: Oropharynx is clear and moist    Eyes: Pupils are equal, round, and reactive to light  Conjunctivae and EOM are normal  No scleral icterus  Neck: Normal range of motion  Neck supple  Carotid bruit is not present  No thyroid mass and no thyromegaly present  Cardiovascular: Normal rate, regular rhythm, normal heart sounds and intact distal pulses     Pulses: Femoral pulses are 2+ on the right side, and 2+ on the left side  Popliteal pulses are 2+ on the right side, and 2+ on the left side  Dorsalis pedis pulses are 2+ on the right side, and 2+ on the left side  Posterior tibial pulses are 2+ on the right side, and 2+ on the left side  Pulmonary/Chest: Effort normal and breath sounds normal  No respiratory distress  She has no wheezes  She has no rales  Abdominal: Soft  Normal aorta and bowel sounds are normal  She exhibits no distension and no mass  There is no hepatosplenomegaly  There is no tenderness  Musculoskeletal: Normal range of motion  She exhibits no edema  Some tenderness is noted over the right greater trochanter  Full range of motion both hips  Lymphadenopathy:        Head (right side): No submandibular and no occipital adenopathy present  Head (left side): No submandibular and no occipital adenopathy present  She has no cervical adenopathy  Right: No inguinal and no supraclavicular adenopathy present  Left: No inguinal and no supraclavicular adenopathy present  Neurological: She is oriented to person, place, and time  Skin: No lesion and no rash noted  Psychiatric: She has a normal mood and affect  Her behavior is normal      Assessment  1  Health maintenance-she is up-to-date with breast, cervical, and colorectal cancer screening  Immunizations updated  DiscussedShingrix  Flu shot today  2  Osteopenia-due for DEXA scan  Advised to continue calcium supplement  3   Insomnia/nor Ng/fatigue-should have sleep study  4  Bilateral hip pain-suspect trochanteric bursitis  Will refer Orthopedics  Plan  Check blood work  Flu shot  DEXA scan  Orthopedic referral   Referral to Sleep Medicine

## 2019-12-26 ENCOUNTER — OFFICE VISIT (OUTPATIENT)
Dept: URGENT CARE | Facility: CLINIC | Age: 64
End: 2019-12-26
Payer: COMMERCIAL

## 2019-12-26 VITALS
SYSTOLIC BLOOD PRESSURE: 131 MMHG | WEIGHT: 147 LBS | DIASTOLIC BLOOD PRESSURE: 83 MMHG | TEMPERATURE: 98.5 F | BODY MASS INDEX: 25.04 KG/M2 | HEART RATE: 75 BPM | RESPIRATION RATE: 18 BRPM | OXYGEN SATURATION: 98 %

## 2019-12-26 DIAGNOSIS — R21 RASH: Primary | ICD-10-CM

## 2019-12-26 PROCEDURE — 99214 OFFICE O/P EST MOD 30 MIN: CPT | Performed by: PHYSICIAN ASSISTANT

## 2019-12-26 RX ORDER — PREDNISONE 10 MG/1
TABLET ORAL
Qty: 30 TABLET | Refills: 0 | Status: SHIPPED | OUTPATIENT
Start: 2019-12-26 | End: 2020-02-12

## 2019-12-26 NOTE — PROGRESS NOTES
[facility]        NAME: Bhanu Husbands is a 62 y o  female  : 1955    MRN: [id number]  DATE: 2019  TIME: 2:56 PM    Assessment and Plan   Rash [R21]  1  Rash  predniSONE 10 mg tablet         Patient Instructions     Patient Instructions   Rash is consistent with contact dermatitis  -Take prednisone taper as directed  -Benadryl for itching  -F/U with PCP within 1 week    Go to ER with worsening symptoms or any signs of distress     Follow up with PCP in 3-5 days  Proceed to  ER if symptoms worsen  Chief Complaint     Chief Complaint   Patient presents with    Rash     c/o of rash since tuesday morning  History of Present Illness       Patient is a 13-year-old female presents today for evaluation of a rash that started Tuesday morning  Rash is on her lower back, arms and legs  She states that the rash is itchy and burning  It is spreading  She tried applying hydrocortisone cream without any relief  no new soaps or detergents  No new medications  Review of Systems   Review of Systems   Constitutional: Negative for chills and fever  HENT: Negative for trouble swallowing  Respiratory: Negative for shortness of breath  Musculoskeletal: Negative for arthralgias  Skin: Positive for rash  Neurological: Negative for headaches  All other systems reviewed and are negative          Current Medications       Current Outpatient Medications:     Doxylamine Succinate, Sleep, (SLEEP AID PO), Take by mouth, Disp: , Rfl:     famotidine (PEPCID) 40 MG tablet, TAKE 1 TABLET AT BEDTIME  (Patient not taking: Reported on 2019), Disp: 90 tablet, Rfl: 2    predniSONE 10 mg tablet, Take 5 pills x 2 days, 4 pills x 2 days, 3 pills x 2 days, 2 pills x 2 days, then 1 pill x 2 days then stop, Disp: 30 tablet, Rfl: 0    saccharomyces boulardii (FLORASTOR) 250 mg capsule, Take 250 mg by mouth daily, Disp: , Rfl:     traZODone (DESYREL) 50 mg tablet, Take 50 mg by mouth daily at bedtime, Disp: , Rfl:     Turmeric 500 MG CAPS, Take by mouth, Disp: , Rfl:     Current Allergies     Allergies as of 12/26/2019 - Reviewed 12/26/2019   Allergen Reaction Noted    Penicillins  06/06/2017            The following portions of the patient's history were reviewed and updated as appropriate: allergies, current medications, past family history, past medical history, past social history, past surgical history and problem list      Past Medical History:   Diagnosis Date    GERD (gastroesophageal reflux disease)     Hyperlipidemia     Insomnia        Past Surgical History:   Procedure Laterality Date    BREAST LUMPECTOMY      lumpectomy, resolved 1999    COLONOSCOPY      resolved 2008    DE ESOPHAGOGASTRODUODENOSCOPY TRANSORAL DIAGNOSTIC N/A 6/8/2017    Procedure: EGD AND COLONOSCOPY;  Surgeon: Juan Nolan MD;  Location: [facility]; Service: Gastroenterology       Family History   Problem Relation Age of Onset    Parkinsonism Mother     Diabetes Father         DM    Cancer Father         urinary bladder    Hypertension Father          Medications have been verified  Objective   /83   Pulse 75   Temp 98 5 °F (36 9 °C) (Temporal)   Resp 18   Wt 66 7 kg (147 lb)   SpO2 98%   BMI 25 04 kg/m²        Physical Exam     Physical Exam   Constitutional: She is oriented to person, place, and time  She appears well-developed and well-nourished  No distress  HENT:   Mouth/Throat: Oropharynx is clear and moist    Cardiovascular: Normal rate, regular rhythm and normal heart sounds  Pulmonary/Chest: Effort normal and breath sounds normal    Neurological: She is alert and oriented to person, place, and time  Skin: Skin is warm and dry  Rash (erythematous papular rash on lower back, arms and legs  No vesicles or drainage ) noted  Psychiatric: She has a normal mood and affect  Nursing note and vitals reviewed

## 2019-12-26 NOTE — PATIENT INSTRUCTIONS
Rash is consistent with contact dermatitis  -Take prednisone taper as directed  -Benadryl for itching  -F/U with PCP within 1 week    Go to ER with worsening symptoms or any signs of distress

## 2019-12-30 ENCOUNTER — TELEPHONE (OUTPATIENT)
Dept: FAMILY MEDICINE CLINIC | Facility: MEDICAL CENTER | Age: 64
End: 2019-12-30

## 2019-12-30 NOTE — TELEPHONE ENCOUNTER
----- Message from Brenden Dallas MD sent at 12/29/2019  7:39 PM EST -----  Notify patient blood work is all good except her cholesterol is high at 266  She does have a very high good HDL of 101 which helps the bad cholesterol  Would like her to really watch her diet and then repeat a lipid profile in 6 months    Can send low-fat diet information if she is interested (see me for this)

## 2019-12-30 NOTE — TELEPHONE ENCOUNTER
Pt aware- would like the low fat diet info   Told her I would mail it along with blood work for 6 months

## 2020-01-14 DIAGNOSIS — E78.5 HYPERLIPIDEMIA, UNSPECIFIED HYPERLIPIDEMIA TYPE: Primary | ICD-10-CM

## 2020-02-12 ENCOUNTER — CONSULT (OUTPATIENT)
Dept: OBGYN CLINIC | Facility: MEDICAL CENTER | Age: 65
End: 2020-02-12
Payer: COMMERCIAL

## 2020-02-12 ENCOUNTER — APPOINTMENT (OUTPATIENT)
Dept: RADIOLOGY | Facility: MEDICAL CENTER | Age: 65
End: 2020-02-12
Payer: COMMERCIAL

## 2020-02-12 VITALS
WEIGHT: 146.8 LBS | SYSTOLIC BLOOD PRESSURE: 116 MMHG | BODY MASS INDEX: 24.46 KG/M2 | HEART RATE: 82 BPM | DIASTOLIC BLOOD PRESSURE: 81 MMHG | HEIGHT: 65 IN

## 2020-02-12 DIAGNOSIS — M25.552 PAIN OF BOTH HIP JOINTS: ICD-10-CM

## 2020-02-12 DIAGNOSIS — M25.551 PAIN OF BOTH HIP JOINTS: ICD-10-CM

## 2020-02-12 DIAGNOSIS — M70.62 TROCHANTERIC BURSITIS OF BOTH HIPS: Primary | ICD-10-CM

## 2020-02-12 DIAGNOSIS — M70.61 TROCHANTERIC BURSITIS OF BOTH HIPS: Primary | ICD-10-CM

## 2020-02-12 PROCEDURE — 1036F TOBACCO NON-USER: CPT | Performed by: ORTHOPAEDIC SURGERY

## 2020-02-12 PROCEDURE — 73521 X-RAY EXAM HIPS BI 2 VIEWS: CPT

## 2020-02-12 PROCEDURE — 3008F BODY MASS INDEX DOCD: CPT | Performed by: ORTHOPAEDIC SURGERY

## 2020-02-12 PROCEDURE — 99203 OFFICE O/P NEW LOW 30 MIN: CPT | Performed by: ORTHOPAEDIC SURGERY

## 2020-02-12 PROCEDURE — 20610 DRAIN/INJ JOINT/BURSA W/O US: CPT | Performed by: ORTHOPAEDIC SURGERY

## 2020-02-12 RX ORDER — BUPIVACAINE HYDROCHLORIDE 2.5 MG/ML
2 INJECTION, SOLUTION INFILTRATION; PERINEURAL
Status: COMPLETED | OUTPATIENT
Start: 2020-02-12 | End: 2020-02-12

## 2020-02-12 RX ORDER — METHYLPREDNISOLONE ACETATE 40 MG/ML
2 INJECTION, SUSPENSION INTRA-ARTICULAR; INTRALESIONAL; INTRAMUSCULAR; SOFT TISSUE
Status: COMPLETED | OUTPATIENT
Start: 2020-02-12 | End: 2020-02-12

## 2020-02-12 RX ADMIN — METHYLPREDNISOLONE ACETATE 2 ML: 40 INJECTION, SUSPENSION INTRA-ARTICULAR; INTRALESIONAL; INTRAMUSCULAR; SOFT TISSUE at 11:45

## 2020-02-12 RX ADMIN — BUPIVACAINE HYDROCHLORIDE 2 ML: 2.5 INJECTION, SOLUTION INFILTRATION; PERINEURAL at 11:45

## 2020-02-12 NOTE — LETTER
February 12, 2020     Patient: Yaa Kong   YOB: 1955   Date of Visit: 2/12/2020       To Whom it May Concern:    Yaa Kong is under my professional care  She was seen in my office on 2/12/2020  Please excuse her from work today  If you have any questions or concerns, please don't hesitate to call           Sincerely,          Skye Milan MD        CC: Yaa Villatoroin

## 2020-02-12 NOTE — PROGRESS NOTES
62 y o female presenting for evaluation of bilateral hip pain  This has been present for about a year and occurred without injury  Pain is located over the lateral aspects of bilateral hips it is made worse with direct contact area and when lying on the affected side and relieved by avoiding contact to the lateral aspect of her hip  She denies any groin pain or pain when she is ambulating  She does work at [employer] and is active in standing most today  Denies any numbness tingling or weakness    Review of Systems  Review of systems negative unless otherwise specified in HPI    Past Medical History  Past Medical History:   Diagnosis Date    GERD (gastroesophageal reflux disease)     Hyperlipidemia     Insomnia        Past Surgical History  Past Surgical History:   Procedure Laterality Date    BREAST LUMPECTOMY      lumpectomy, resolved 1999    COLONOSCOPY      resolved 2008    [place] ESOPHAGOGASTRODUODENOSCOPY TRANSORAL DIAGNOSTIC N/A 6/8/2017    Procedure: EGD AND COLONOSCOPY;  Surgeon: Nir Schroeder MD;  Location: [facility]; Service: Gastroenterology       Current Medications  Current Outpatient Medications on File Prior to Visit   Medication Sig Dispense Refill    Doxylamine Succinate, Sleep, (SLEEP AID PO) Take by mouth      famotidine (PEPCID) 40 MG tablet TAKE 1 TABLET AT BEDTIME  90 tablet 2    traZODone (DESYREL) 50 mg tablet Take 50 mg by mouth daily at bedtime      Turmeric 500 MG CAPS Take by mouth      [DISCONTINUED] predniSONE 10 mg tablet Take 5 pills x 2 days, 4 pills x 2 days, 3 pills x 2 days, 2 pills x 2 days, then 1 pill x 2 days then stop 30 tablet 0    [DISCONTINUED] saccharomyces boulardii (FLORASTOR) 250 mg capsule Take 250 mg by mouth daily       No current facility-administered medications on file prior to visit          Recent Labs (HCT,HGB,PT,INR,ESR,CRP,GLU,HgA1C)  0   Lab Value Date/Time    HCT 43 8 12/17/2019 1120    HGB 14 2 12/17/2019 1120    WBC 5 27 12/17/2019 1120 support staff and site/side marked as required   Supporting Documentation  Indications: pain   Procedure Details  Location: hip - L greater trochanteric bursa  Needle size: 22 G  Ultrasound guidance: no  Approach: lateral  Medications administered: 2 mL bupivacaine 0 25 %; 2 mL methylPREDNISolone acetate 40 mg/mL    Patient tolerance: patient tolerated the procedure well with no immediate complications  Dressing:  Sterile dressing applied            Assessment/Plan:   62 y  o female with bilateral trochanteric bursitis    · Injection of steroid medication was administered into bilateral trochanteric bursas as outlined above  · Patient given handout on a home exercise program for IT band stretching and strengthening exercises  · Follow-up as needed

## 2020-12-07 ENCOUNTER — APPOINTMENT (OUTPATIENT)
Dept: RADIOLOGY | Facility: MEDICAL CENTER | Age: 65
End: 2020-12-07
Payer: COMMERCIAL

## 2020-12-07 ENCOUNTER — OFFICE VISIT (OUTPATIENT)
Dept: URGENT CARE | Facility: MEDICAL CENTER | Age: 65
End: 2020-12-07
Payer: COMMERCIAL

## 2020-12-07 VITALS
TEMPERATURE: 97.7 F | SYSTOLIC BLOOD PRESSURE: 130 MMHG | OXYGEN SATURATION: 92 % | RESPIRATION RATE: 18 BRPM | BODY MASS INDEX: 24.49 KG/M2 | DIASTOLIC BLOOD PRESSURE: 78 MMHG | HEIGHT: 65 IN | HEART RATE: 101 BPM | WEIGHT: 147 LBS

## 2020-12-07 DIAGNOSIS — M25.562 LEFT KNEE PAIN, UNSPECIFIED CHRONICITY: ICD-10-CM

## 2020-12-07 DIAGNOSIS — S82.002A CLOSED NONDISPLACED FRACTURE OF LEFT PATELLA, UNSPECIFIED FRACTURE MORPHOLOGY, INITIAL ENCOUNTER: Primary | ICD-10-CM

## 2020-12-07 PROCEDURE — 99213 OFFICE O/P EST LOW 20 MIN: CPT | Performed by: PHYSICIAN ASSISTANT

## 2020-12-07 PROCEDURE — 73564 X-RAY EXAM KNEE 4 OR MORE: CPT

## 2020-12-08 ENCOUNTER — OFFICE VISIT (OUTPATIENT)
Dept: OBGYN CLINIC | Facility: CLINIC | Age: 65
End: 2020-12-08
Payer: COMMERCIAL

## 2020-12-08 VITALS
SYSTOLIC BLOOD PRESSURE: 151 MMHG | HEART RATE: 77 BPM | DIASTOLIC BLOOD PRESSURE: 99 MMHG | WEIGHT: 149.8 LBS | BODY MASS INDEX: 24.96 KG/M2 | HEIGHT: 65 IN

## 2020-12-08 DIAGNOSIS — S82.002A CLOSED NONDISPLACED FRACTURE OF LEFT PATELLA, UNSPECIFIED FRACTURE MORPHOLOGY, INITIAL ENCOUNTER: Primary | ICD-10-CM

## 2020-12-08 PROCEDURE — 99213 OFFICE O/P EST LOW 20 MIN: CPT | Performed by: ORTHOPAEDIC SURGERY

## 2020-12-08 PROCEDURE — 1036F TOBACCO NON-USER: CPT | Performed by: ORTHOPAEDIC SURGERY

## 2020-12-08 PROCEDURE — 27520 TREAT KNEECAP FRACTURE: CPT | Performed by: ORTHOPAEDIC SURGERY

## 2020-12-08 PROCEDURE — 3008F BODY MASS INDEX DOCD: CPT | Performed by: ORTHOPAEDIC SURGERY

## 2020-12-14 ENCOUNTER — TELEPHONE (OUTPATIENT)
Dept: OBGYN CLINIC | Facility: CLINIC | Age: 65
End: 2020-12-14

## 2020-12-17 ENCOUNTER — TELEPHONE (OUTPATIENT)
Dept: FAMILY MEDICINE CLINIC | Facility: MEDICAL CENTER | Age: 65
End: 2020-12-17

## 2021-01-13 DIAGNOSIS — S82.002A CLOSED NONDISPLACED FRACTURE OF LEFT PATELLA, UNSPECIFIED FRACTURE MORPHOLOGY, INITIAL ENCOUNTER: Primary | ICD-10-CM

## 2021-01-15 ENCOUNTER — OFFICE VISIT (OUTPATIENT)
Dept: OBGYN CLINIC | Facility: CLINIC | Age: 66
End: 2021-01-15

## 2021-01-15 ENCOUNTER — APPOINTMENT (OUTPATIENT)
Dept: RADIOLOGY | Facility: CLINIC | Age: 66
End: 2021-01-15
Payer: COMMERCIAL

## 2021-01-15 VITALS
WEIGHT: 149.2 LBS | SYSTOLIC BLOOD PRESSURE: 138 MMHG | DIASTOLIC BLOOD PRESSURE: 88 MMHG | BODY MASS INDEX: 24.86 KG/M2 | HEART RATE: 77 BPM | HEIGHT: 65 IN

## 2021-01-15 DIAGNOSIS — S82.002A CLOSED NONDISPLACED FRACTURE OF LEFT PATELLA, UNSPECIFIED FRACTURE MORPHOLOGY, INITIAL ENCOUNTER: ICD-10-CM

## 2021-01-15 DIAGNOSIS — S82.035D CLOSED NONDISPLACED TRANSVERSE FRACTURE OF LEFT PATELLA WITH ROUTINE HEALING, SUBSEQUENT ENCOUNTER: Primary | ICD-10-CM

## 2021-01-15 PROCEDURE — 99024 POSTOP FOLLOW-UP VISIT: CPT | Performed by: ORTHOPAEDIC SURGERY

## 2021-01-15 PROCEDURE — 73560 X-RAY EXAM OF KNEE 1 OR 2: CPT

## 2021-01-15 PROCEDURE — 3008F BODY MASS INDEX DOCD: CPT | Performed by: ORTHOPAEDIC SURGERY

## 2021-01-15 NOTE — PROGRESS NOTES
Orthopaedics Office Visit - Follow-up Patient Visit    ASSESSMENT/PLAN:    Assessment:   Left patella fracture, less than 3 mm displacement at articular surface, intact extensor mechanism, now healed  Resolved extensor lag    Plan:   Weightbear as tolerated left lower extremity, brace no longer required  Followup as needed  Cleared for no restrictions at work    To Do Next Visit:  prn  _____________________________________________________  CHIEF COMPLAINT:  Chief Complaint   Patient presents with    Left Knee - Follow-up         SUBJECTIVE:  Emma March is a 60 y o  female who presents now 6 weeks after sustaining a fall while running at night with a direct impact to her left knee  She had persistent pain and difficulty bearing weight and presented to a different facility where she was diagnosed with a patella fracture and placed in a knee immobilizer  She has resolution of pain and has been able to bear weight without significant difficulty  She has been bending her knee already  She denies any new injuries  She is comfortable at rest     PAST MEDICAL HISTORY:  Past Medical History:   Diagnosis Date    GERD (gastroesophageal reflux disease)     Hyperlipidemia     Insomnia        PAST SURGICAL HISTORY:  Past Surgical History:   Procedure Laterality Date    BREAST LUMPECTOMY      lumpectomy, resolved 1999    COLONOSCOPY      resolved 2008    [place] ESOPHAGOGASTRODUODENOSCOPY TRANSORAL DIAGNOSTIC N/A 6/8/2017    Procedure: EGD AND COLONOSCOPY;  Surgeon: Delaney Torres MD;  Location: [facility];   Service: Gastroenterology       FAMILY HISTORY:  Family History   Problem Relation Age of Onset   Geovanna Drop Parkinsonism Mother     Diabetes Father         DM    Cancer Father         urinary bladder    Hypertension Father        SOCIAL HISTORY:  Social History     Tobacco Use    Smoking status: Never Smoker    Smokeless tobacco: Never Used   Substance Use Topics    Alcohol use: Yes     Comment: occasional - history  Drug use: No       MEDICATIONS:    Current Outpatient Medications:     famotidine (PEPCID) 40 MG tablet, TAKE 1 TABLET AT BEDTIME , Disp: 90 tablet, Rfl: 2    Turmeric 500 MG CAPS, Take by mouth, Disp: , Rfl:     Doxylamine Succinate, Sleep, (SLEEP AID PO), Take by mouth, Disp: , Rfl:     traZODone (DESYREL) 50 mg tablet, Take 50 mg by mouth daily at bedtime, Disp: , Rfl:     ALLERGIES:  Allergies   Allergen Reactions    Penicillins Rash     Pt  Not sure what allergy is  REVIEW OF SYSTEMS:  MSK:  Left knee pain  Neuro:  Negative  Pertinent items are otherwise noted in HPI  A comprehensive review of systems was otherwise negative  LABS:  HgA1c: No results found for: HGBA1C  BMP:   Lab Results   Component Value Date    CALCIUM 9 3 12/17/2019    K 3 7 12/17/2019    CO2 32 12/17/2019     12/17/2019    BUN 14 12/17/2019    CREATININE 0 69 12/17/2019     CBC: No components found for: CBC    _____________________________________________________  PHYSICAL EXAMINATION:  Vital signs: /88   Pulse 77   Ht 5' 5" (1 651 m)   Wt 67 7 kg (149 lb 3 2 oz)   BMI 24 83 kg/m²   General: No acute distress, awake and alert  Psychiatric: Mood and affect appear appropriate  HEENT: Trachea Midline, No torticollis, no apparent facial trauma  Cardiovascular: No audible murmurs;  Extremities appear perfused  Pulmonary: No audible wheezing or stridor  Skin: No open lesions; see further details (if any) below    MUSCULOSKELETAL EXAMINATION:  Extremities:  LLE:  No tenderness to palpation over patella  Extensor lag resolved  Thigh and calf compressible  Intact ankle dorsiflexion, plantar flexion, EHL, FHL motor function  Extremity warm and well perfused      _____________________________________________________  STUDIES REVIEWED:  I personally reviewed the images and xrays reveal healed patella fracture without displacement      PROCEDURES PERFORMED:  Procedures    Stan Guerra MD

## 2021-03-30 ENCOUNTER — OFFICE VISIT (OUTPATIENT)
Dept: GASTROENTEROLOGY | Facility: CLINIC | Age: 66
End: 2021-03-30
Payer: COMMERCIAL

## 2021-03-30 VITALS
BODY MASS INDEX: 24.16 KG/M2 | WEIGHT: 145 LBS | DIASTOLIC BLOOD PRESSURE: 80 MMHG | SYSTOLIC BLOOD PRESSURE: 120 MMHG | HEIGHT: 65 IN | HEART RATE: 78 BPM

## 2021-03-30 DIAGNOSIS — R19.7 DIARRHEA, UNSPECIFIED TYPE: ICD-10-CM

## 2021-03-30 DIAGNOSIS — Z80.0 FAMILY HISTORY OF COLON CANCER IN FATHER: ICD-10-CM

## 2021-03-30 DIAGNOSIS — R10.30 LOWER ABDOMINAL PAIN: Primary | ICD-10-CM

## 2021-03-30 PROCEDURE — 1036F TOBACCO NON-USER: CPT | Performed by: PHYSICIAN ASSISTANT

## 2021-03-30 PROCEDURE — 3008F BODY MASS INDEX DOCD: CPT | Performed by: PHYSICIAN ASSISTANT

## 2021-03-30 PROCEDURE — 99204 OFFICE O/P NEW MOD 45 MIN: CPT | Performed by: PHYSICIAN ASSISTANT

## 2021-03-30 RX ORDER — DICYCLOMINE HCL 20 MG
20 TABLET ORAL 3 TIMES DAILY PRN
Qty: 90 TABLET | Refills: 0 | Status: SHIPPED | OUTPATIENT
Start: 2021-03-30 | End: 2021-04-21

## 2021-03-30 RX ORDER — ESTRADIOL 0.1 MG/G
CREAM VAGINAL
COMMUNITY
Start: 2020-12-31 | End: 2021-12-13

## 2021-03-30 NOTE — PROGRESS NOTES
[facility] - Outpatient Consultation  Oz Valles 60 y o  female MRN: [id number]  Encounter: [id number]          ASSESSMENT AND PLAN:      1  Diarrhea  2  Lower abdominal pain  3  Family history of colon cancer in father    Patient presents with complaints of chronic diarrhea and bloating for almost a year  She also reports of lower abdominal pain x couple weeks  She is on a probiotic  She reports a father with colon cancer and her last colonoscopy was in 2017  Will check CBC/CMP/TSH/CRP/celiac serology  CT Scan A/P to investigate  Colonoscopy with visualization on the terminal ileum to investigate - rule out IBD, malignancy, bx for microscopic colitis, etc   Continue the probiotic and add Benefiber  Bentyl 20mg po TID prn  Follow up in 4 weeks  If work up negative and insufficient improvement on the above regimen, will begin a Xifaxan 550mg po TID x 14 days course for IBS-D   ______________________________________________________________________    HPI:  Patient is a 60year old female who presents to the office for an evaluation of abdominal pain and diarrhea  Patient reports she has been having diarrhea for almost a year now  She reports she can have normal stools sometimes too but often has loose, watery stools  She reports 2 to 3 BMs a day  She also reports significant bloating and gas  She reports tried to avoid dairy but symptoms persisted despite this dietary change  She also reports that over the past couple of weeks she is now having lower abdominal pain and cramping as well  She denies any blood in the stool  Her last colonoscopy was in 2017 and normal   She reports a father with colon cancer  She reports her mother had a colostomy bag but she is not sure why she required surgery  REVIEW OF SYSTEMS:    CONSTITUTIONAL: Denies any fever, chills, rigors, and weight loss  HEENT: No earache or tinnitus   Denies hearing loss or visual disturbances  CARDIOVASCULAR: No chest pain or palpitations  RESPIRATORY: Denies any cough, hemoptysis, shortness of breath or dyspnea on exertion  GASTROINTESTINAL: As noted in the History of Present Illness  GENITOURINARY: No problems with urination  Denies any hematuria or dysuria  NEUROLOGIC: No dizziness or vertigo, denies headaches  MUSCULOSKELETAL: Denies any muscle or joint pain  SKIN: Denies skin rashes or itching  ENDOCRINE: Denies excessive thirst  Denies intolerance to heat or cold  PSYCHOSOCIAL: Denies depression or anxiety  Denies any recent memory loss  Historical Information   Past Medical History:   Diagnosis Date    GERD (gastroesophageal reflux disease)     Hyperlipidemia     Insomnia      Past Surgical History:   Procedure Laterality Date    BREAST LUMPECTOMY      lumpectomy, resolved 1999    COLONOSCOPY      resolved 2008    [place] ESOPHAGOGASTRODUODENOSCOPY TRANSORAL DIAGNOSTIC N/A 6/8/2017    Procedure: EGD AND COLONOSCOPY;  Surgeon: Cayden Jo MD;  Location: [facility]; Service: Gastroenterology     Social History   Social History     Substance and Sexual Activity   Alcohol Use Yes    Comment: occasional - history     Social History     Substance and Sexual Activity   Drug Use No     Social History     Tobacco Use   Smoking Status Never Smoker   Smokeless Tobacco Never Used     Family History   Problem Relation Age of Onset    Parkinsonism Mother     Diabetes Father         DM    Cancer Father         urinary bladder    Hypertension Father        Meds/Allergies       Current Outpatient Medications:     Doxylamine Succinate, Sleep, (SLEEP AID PO)    estradiol (ESTRACE) 0 1 mg/g vaginal cream    famotidine (PEPCID) 40 MG tablet    traZODone (DESYREL) 50 mg tablet    Turmeric 500 MG CAPS    dicyclomine (BENTYL) 20 mg tablet    Allergies   Allergen Reactions    Penicillins Rash     Pt  Not sure what allergy is             Objective     Blood pressure 120/80, pulse 78, height 5' 5" (1 651 m), weight 65 8 kg (145 lb)  Body mass index is 24 13 kg/m²  PHYSICAL EXAM:      General Appearance:   Alert, cooperative, no distress   HEENT:   Normocephalic, atraumatic, anicteric      Neck:  Supple, symmetrical, trachea midline   Lungs:   Clear to auscultation bilaterally; no rales, rhonchi or wheezing; respirations unlabored    Heart[de-identified]   Regular rate and rhythm; no murmur, rub, or gallop  Abdomen:   Soft, non-tender, non-distended; normal bowel sounds; no masses, no organomegaly    Genitalia:   Deferred    Rectal:   Deferred    Extremities:  No cyanosis, clubbing or edema    Pulses:  2+ and symmetric    Skin:  No jaundice, rashes, or lesions    Lymph nodes:  No palpable cervical lymphadenopathy        Lab Results:   No visits with results within 1 Day(s) from this visit     Latest known visit with results is:   Office Visit on 12/17/2019   Component Date Value    WBC 12/17/2019 5 27     RBC 12/17/2019 4 49     Hemoglobin 12/17/2019 14 2     Hematocrit 12/17/2019 43 8     MCV 12/17/2019 98     MCH 12/17/2019 31 6     MCHC 12/17/2019 32 4     RDW 12/17/2019 13 0     MPV 12/17/2019 10 0     Platelets 00/54/0664 307     nRBC 12/17/2019 0     Neutrophils Relative 12/17/2019 65     Immat GRANS % 12/17/2019 0     Lymphocytes Relative 12/17/2019 21     Monocytes Relative 12/17/2019 7     Eosinophils Relative 12/17/2019 5     Basophils Relative 12/17/2019 2*    Neutrophils Absolute 12/17/2019 3 47     Immature Grans Absolute 12/17/2019 0 02     Lymphocytes Absolute 12/17/2019 1 10     Monocytes Absolute 12/17/2019 0 36     Eosinophils Absolute 12/17/2019 0 24     Basophils Absolute 12/17/2019 0 08     Sodium 12/17/2019 140     Potassium 12/17/2019 3 7     Chloride 12/17/2019 104     CO2 12/17/2019 32     ANION GAP 12/17/2019 4     BUN 12/17/2019 14     Creatinine 12/17/2019 0 69     Glucose, Fasting 12/17/2019 89     Calcium 12/17/2019 9 3  AST 12/17/2019 20     ALT 12/17/2019 18     Alkaline Phosphatase 12/17/2019 78     Total Protein 12/17/2019 7 7     Albumin 12/17/2019 4 1     Total Bilirubin 12/17/2019 0 51     eGFR 12/17/2019 93     Cholesterol 12/17/2019 266*    Triglycerides 12/17/2019 48     HDL, Direct 12/17/2019 101     LDL Calculated 12/17/2019 155*    Non-HDL-Chol (CHOL-HDL) 12/17/2019 165     TSH 3RD GENERATON 12/17/2019 0 897     Vit D, 25-Hydroxy 12/17/2019 32 2          Radiology Results:   No results found

## 2021-04-05 ENCOUNTER — TELEPHONE (OUTPATIENT)
Dept: GASTROENTEROLOGY | Facility: CLINIC | Age: 66
End: 2021-04-05

## 2021-04-12 ENCOUNTER — TELEPHONE (OUTPATIENT)
Dept: GASTROENTEROLOGY | Facility: CLINIC | Age: 66
End: 2021-04-12

## 2021-04-12 NOTE — TELEPHONE ENCOUNTER
----- Message from Tracey Arauz PA-C sent at 4/12/2021  1:00 PM EDT -----  Please inform patient that the blood work was normal: CBC, CMP, and TSH all normal and testing for celiac disease was negative

## 2021-04-20 ENCOUNTER — HOSPITAL ENCOUNTER (OUTPATIENT)
Dept: RADIOLOGY | Facility: MEDICAL CENTER | Age: 66
Discharge: HOME | End: 2021-04-20
Payer: COMMERCIAL

## 2021-04-20 DIAGNOSIS — R10.30 LOWER ABDOMINAL PAIN: ICD-10-CM

## 2021-04-20 PROCEDURE — 74177 CT ABD & PELVIS W/CONTRAST: CPT

## 2021-04-20 PROCEDURE — G1004 CDSM NDSC: HCPCS

## 2021-04-20 RX ADMIN — IOHEXOL 100 ML: 350 INJECTION, SOLUTION INTRAVENOUS at 11:56

## 2021-04-21 DIAGNOSIS — R10.30 LOWER ABDOMINAL PAIN: ICD-10-CM

## 2021-04-21 RX ORDER — DICYCLOMINE HCL 20 MG
20 TABLET ORAL 3 TIMES DAILY PRN
Qty: 90 TABLET | Refills: 0 | Status: SHIPPED | OUTPATIENT
Start: 2021-04-21 | End: 2022-03-28 | Stop reason: ALTCHOICE

## 2021-04-28 ENCOUNTER — TELEPHONE (OUTPATIENT)
Dept: GASTROENTEROLOGY | Facility: CLINIC | Age: 66
End: 2021-04-28

## 2021-04-28 NOTE — TELEPHONE ENCOUNTER
DIANNE Olmos MA             Please inform patient that the ct scan was negative for any acute abnormalities in the abdomen/pelvis

## 2021-05-04 ENCOUNTER — ANESTHESIA (OUTPATIENT)
Dept: GASTROENTEROLOGY | Facility: HOSPITAL | Age: 66
End: 2021-05-04

## 2021-05-04 ENCOUNTER — ANESTHESIA EVENT (OUTPATIENT)
Dept: GASTROENTEROLOGY | Facility: HOSPITAL | Age: 66
End: 2021-05-04

## 2021-05-04 ENCOUNTER — HOSPITAL ENCOUNTER (OUTPATIENT)
Dept: GASTROENTEROLOGY | Facility: HOSPITAL | Age: 66
Setting detail: OUTPATIENT SURGERY
Discharge: HOME | End: 2021-05-04
Attending: INTERNAL MEDICINE | Admitting: INTERNAL MEDICINE
Payer: COMMERCIAL

## 2021-05-04 VITALS
WEIGHT: 140.65 LBS | TEMPERATURE: 98.4 F | OXYGEN SATURATION: 98 % | RESPIRATION RATE: 16 BRPM | DIASTOLIC BLOOD PRESSURE: 62 MMHG | BODY MASS INDEX: 23.43 KG/M2 | HEIGHT: 65 IN | HEART RATE: 65 BPM | SYSTOLIC BLOOD PRESSURE: 104 MMHG

## 2021-05-04 DIAGNOSIS — R19.7 DIARRHEA, UNSPECIFIED TYPE: ICD-10-CM

## 2021-05-04 DIAGNOSIS — Z80.0 FAMILY HISTORY OF COLON CANCER IN FATHER: ICD-10-CM

## 2021-05-04 DIAGNOSIS — R10.30 LOWER ABDOMINAL PAIN: ICD-10-CM

## 2021-05-04 PROCEDURE — 88305 TISSUE EXAM BY PATHOLOGIST: CPT | Performed by: PATHOLOGY

## 2021-05-04 PROCEDURE — 45380 COLONOSCOPY AND BIOPSY: CPT | Performed by: INTERNAL MEDICINE

## 2021-05-04 RX ORDER — SODIUM CHLORIDE, SODIUM LACTATE, POTASSIUM CHLORIDE, CALCIUM CHLORIDE 600; 310; 30; 20 MG/100ML; MG/100ML; MG/100ML; MG/100ML
125 INJECTION, SOLUTION INTRAVENOUS CONTINUOUS
Status: DISCONTINUED | OUTPATIENT
Start: 2021-05-04 | End: 2021-05-08 | Stop reason: HOSPADM

## 2021-05-04 RX ORDER — PROPOFOL 10 MG/ML
INJECTION, EMULSION INTRAVENOUS AS NEEDED
Status: DISCONTINUED | OUTPATIENT
Start: 2021-05-04 | End: 2021-05-04

## 2021-05-04 RX ADMIN — SODIUM CHLORIDE, SODIUM LACTATE, POTASSIUM CHLORIDE, AND CALCIUM CHLORIDE 125 ML/HR: .6; .31; .03; .02 INJECTION, SOLUTION INTRAVENOUS at 10:27

## 2021-05-04 RX ADMIN — PROPOFOL 30 MG: 10 INJECTION, EMULSION INTRAVENOUS at 11:34

## 2021-05-04 RX ADMIN — PROPOFOL 100 MG: 10 INJECTION, EMULSION INTRAVENOUS at 11:26

## 2021-05-04 RX ADMIN — PROPOFOL 20 MG: 10 INJECTION, EMULSION INTRAVENOUS at 11:38

## 2021-05-04 RX ADMIN — PROPOFOL 40 MG: 10 INJECTION, EMULSION INTRAVENOUS at 11:29

## 2021-05-04 NOTE — H&P
History and Physical -  [facility]  Kristen Hollins 60 y o  female MRN: [id number]                  HPI: Kristen Hollins is a 60y o  year old female who presents for colonoscopy for lower abdominal pain, diarrhea, family history of colon cancer  Last colonoscopy 4 years ago      REVIEW OF SYSTEMS: Per the HPI, and otherwise unremarkable  Historical Information   Past Medical History:   Diagnosis Date    GERD (gastroesophageal reflux disease)     Hyperlipidemia     Insomnia      Past Surgical History:   Procedure Laterality Date    BREAST LUMPECTOMY      lumpectomy, resolved 1999    COLONOSCOPY      resolved 2008    [place] ESOPHAGOGASTRODUODENOSCOPY TRANSORAL DIAGNOSTIC N/A 6/8/2017    Procedure: EGD AND COLONOSCOPY;  Surgeon: Luciano Estrada MD;  Location: [facility]; Service: Gastroenterology     Social History   Social History     Substance and Sexual Activity   Alcohol Use Yes    Comment: occasional - history     Social History     Substance and Sexual Activity   Drug Use No     Social History     Tobacco Use   Smoking Status Never Smoker   Smokeless Tobacco Never Used     Family History   Problem Relation Age of Onset    Parkinsonism Mother     Diabetes Father         DM    Cancer Father         urinary bladder    Hypertension Father        Meds/Allergies     (Not in a hospital admission)      Allergies   Allergen Reactions    Penicillins Rash     Pt  Not sure what allergy is  Objective     There were no vitals taken for this visit        PHYSICAL EXAM    Gen: NAD  CV: RRR  CHEST: Clear  ABD: soft, NT/ND  EXT: no edema  Neuro: AAO      ASSESSMENT/PLAN:  This is a 60y o  year old female here for lower abdominal pain, diarrhea, family history of colon cancer    PLAN:   Procedure:  Colonoscopy

## 2021-05-04 NOTE — ANESTHESIA PREPROCEDURE EVALUATION
Procedure:  COLONOSCOPY    Relevant Problems   No relevant active problems        Physical Exam    Airway    Mallampati score: II  TM Distance: >3 FB  Neck ROM: full     Dental   No notable dental hx     Cardiovascular  Rhythm: regular, Rate: normal, Cardiovascular exam normal    Pulmonary  Pulmonary exam normal Breath sounds clear to auscultation,     Other Findings        Anesthesia Plan  ASA Score- 2     Anesthesia Type- IV sedation with anesthesia with ASA Monitors  Additional Monitors:   Airway Plan:           Plan Factors-Exercise tolerance (METS): >4 METS  Chart reviewed  Patient is not a current smoker  Patient instructed to abstain from smoking on day of procedure  Patient did not smoke on day of surgery  There is medical exclusion for perioperative obstructive sleep apnea risk education  Induction- intravenous  Postoperative Plan-     Informed Consent- Anesthetic plan and risks discussed with patient  I personally reviewed this patient with the CRNA  Discussed and agreed on the Anesthesia Plan with the CRNA  Karrie Nissen

## 2021-05-04 NOTE — INTERVAL H&P NOTE
H&P reviewed  After examining the patient I find no changes in the patients condition since the H&P had been written      Vitals:    05/04/21 1015   BP: 125/77   Pulse: 78   Resp: 16   Temp: 98 1 °F (36 7 °C)   SpO2: 99%

## 2021-11-02 ENCOUNTER — TELEPHONE (OUTPATIENT)
Dept: FAMILY MEDICINE CLINIC | Facility: MEDICAL CENTER | Age: 66
End: 2021-11-02

## 2021-11-05 ENCOUNTER — TELEPHONE (OUTPATIENT)
Dept: FAMILY MEDICINE CLINIC | Facility: MEDICAL CENTER | Age: 66
End: 2021-11-05

## 2021-12-13 ENCOUNTER — OFFICE VISIT (OUTPATIENT)
Dept: FAMILY MEDICINE CLINIC | Facility: MEDICAL CENTER | Age: 66
End: 2021-12-13
Payer: MEDICARE

## 2021-12-13 VITALS
BODY MASS INDEX: 24.83 KG/M2 | WEIGHT: 149 LBS | HEART RATE: 68 BPM | RESPIRATION RATE: 16 BRPM | SYSTOLIC BLOOD PRESSURE: 124 MMHG | TEMPERATURE: 98.2 F | HEIGHT: 65 IN | DIASTOLIC BLOOD PRESSURE: 78 MMHG

## 2021-12-13 DIAGNOSIS — E78.5 HYPERLIPIDEMIA, UNSPECIFIED HYPERLIPIDEMIA TYPE: Primary | ICD-10-CM

## 2021-12-13 DIAGNOSIS — Z13.29 THYROID DISORDER SCREEN: ICD-10-CM

## 2021-12-13 DIAGNOSIS — H92.01 RIGHT EAR PAIN: ICD-10-CM

## 2021-12-13 DIAGNOSIS — M77.8 TENDONITIS OF SHOULDER, LEFT: ICD-10-CM

## 2021-12-13 PROCEDURE — 99214 OFFICE O/P EST MOD 30 MIN: CPT | Performed by: FAMILY MEDICINE

## 2022-01-05 ENCOUNTER — TELEPHONE (OUTPATIENT)
Dept: ADMINISTRATIVE | Facility: OTHER | Age: 67
End: 2022-01-05

## 2022-01-05 NOTE — TELEPHONE ENCOUNTER
----- Message from Chandra López sent at 1/4/2022  1:16 PM EST -----  Regarding: care gap request  01/04/22 1:17 PM    Hello, our patient Kimberlee Villasenor has had Mammogram completed/performed  Please assist in updating the patient chart by pulling the Care Everywhere (CE) document  The date of service is 2021       Thank you,  Nadja Rivera MA  [facility]

## 2022-01-14 NOTE — TELEPHONE ENCOUNTER
Upon review of the In Basket request we were able to locate, review, and update the patient chart as requested for Mammogram     Any additional questions or concerns should be emailed to the Practice Liaisons via [email or web address] email, please do not reply via In Basket      Thank you  Charlie Melchor

## 2022-02-22 ENCOUNTER — OFFICE VISIT (OUTPATIENT)
Dept: OBGYN CLINIC | Facility: CLINIC | Age: 67
End: 2022-02-22
Payer: MEDICARE

## 2022-02-22 ENCOUNTER — APPOINTMENT (OUTPATIENT)
Dept: RADIOLOGY | Facility: CLINIC | Age: 67
End: 2022-02-22
Payer: MEDICARE

## 2022-02-22 VITALS
SYSTOLIC BLOOD PRESSURE: 126 MMHG | WEIGHT: 150.4 LBS | DIASTOLIC BLOOD PRESSURE: 87 MMHG | BODY MASS INDEX: 25.03 KG/M2 | HEART RATE: 76 BPM

## 2022-02-22 DIAGNOSIS — M79.602 LEFT ARM PAIN: ICD-10-CM

## 2022-02-22 DIAGNOSIS — M25.512 LEFT SHOULDER PAIN, UNSPECIFIED CHRONICITY: ICD-10-CM

## 2022-02-22 DIAGNOSIS — M75.82 ROTATOR CUFF TENDINITIS, LEFT: Primary | ICD-10-CM

## 2022-02-22 PROCEDURE — 73060 X-RAY EXAM OF HUMERUS: CPT

## 2022-02-22 PROCEDURE — 73030 X-RAY EXAM OF SHOULDER: CPT

## 2022-02-22 PROCEDURE — 20610 DRAIN/INJ JOINT/BURSA W/O US: CPT | Performed by: ORTHOPAEDIC SURGERY

## 2022-02-22 PROCEDURE — 99213 OFFICE O/P EST LOW 20 MIN: CPT | Performed by: ORTHOPAEDIC SURGERY

## 2022-02-22 RX ORDER — TRIAMCINOLONE ACETONIDE 40 MG/ML
80 INJECTION, SUSPENSION INTRA-ARTICULAR; INTRAMUSCULAR
Status: COMPLETED | OUTPATIENT
Start: 2022-02-22 | End: 2022-02-22

## 2022-02-22 RX ORDER — IBUPROFEN 200 MG
TABLET ORAL EVERY 6 HOURS PRN
COMMUNITY

## 2022-02-22 RX ORDER — BUPIVACAINE HYDROCHLORIDE 2.5 MG/ML
2 INJECTION, SOLUTION INFILTRATION; PERINEURAL
Status: COMPLETED | OUTPATIENT
Start: 2022-02-22 | End: 2022-02-22

## 2022-02-22 RX ADMIN — TRIAMCINOLONE ACETONIDE 80 MG: 40 INJECTION, SUSPENSION INTRA-ARTICULAR; INTRAMUSCULAR at 11:51

## 2022-02-22 RX ADMIN — BUPIVACAINE HYDROCHLORIDE 2 ML: 2.5 INJECTION, SOLUTION INFILTRATION; PERINEURAL at 11:51

## 2022-02-22 NOTE — PROGRESS NOTES
Orthopaedics Office Visit - New injury Patient Visit    ASSESSMENT/PLAN:    Assessment:   Left shoulder rotator cuff tendinitis vs tear     Plan:   · X-rays were performed in the office today and reviewed   · PT was ordered   · Discussed a left shoulder subacromial CSI to help with pain control, she elected to proceed   · Left shoulder subacromial CSI was performed in the office without complication, post injection protocol/expectations were reviewed   · If symptoms fail to improve in 6-8 weeks time, a MRI may be ordered   · Follow up in 6 weeks time for re-evaluation     To Do Next Visit:  Re-evaluation     _____________________________________________________  CHIEF COMPLAINT:  Chief Complaint   Patient presents with    Left Arm - Pain         SUBJECTIVE:  Byron Harley is a 73 y o  RHD female who presents to the office for left arm pain  Percy Eric notes pain to her left humerus area  She notes pain can radiate to her collar bone at night  Pain will worsen after lifting boxes at work, she works for [employer]  Pain has been ongoing for aprox  4/5 months  Denies any injury or trama  Percy Eric is taking Aleve and Tylenol for pain control  Denies any previous left arm injury or treatment  PAST MEDICAL HISTORY:  Past Medical History:   Diagnosis Date    GERD (gastroesophageal reflux disease)     Hyperlipidemia     Insomnia        PAST SURGICAL HISTORY:  Past Surgical History:   Procedure Laterality Date    BREAST LUMPECTOMY      lumpectomy, resolved 1999    COLONOSCOPY      resolved 2008    [place] ESOPHAGOGASTRODUODENOSCOPY TRANSORAL DIAGNOSTIC N/A 6/8/2017    Procedure: EGD AND COLONOSCOPY;  Surgeon: Urvashi Beltran MD;  Location: [facility];   Service: Gastroenterology       FAMILY HISTORY:  Family History   Problem Relation Age of Onset   Cornelia Staufferk Parkinsonism Mother     Diabetes Father         DM    Cancer Father         urinary bladder    Hypertension Father        SOCIAL HISTORY:  Social History     Tobacco Use    Smoking status: Never Smoker    Smokeless tobacco: Never Used   Vaping Use    Vaping Use: Never used   Substance Use Topics    Alcohol use: Yes     Alcohol/week: 1 0 standard drink     Types: 1 Glasses of wine per week     Comment: one glass a day    Drug use: No       MEDICATIONS:    Current Outpatient Medications:     ibuprofen (MOTRIN) 200 mg tablet, Take by mouth every 6 (six) hours as needed for mild pain, Disp: , Rfl:     Turmeric 500 MG CAPS, Take by mouth, Disp: , Rfl:     dicyclomine (BENTYL) 20 mg tablet, TAKE 1 TABLET (20 MG TOTAL) BY MOUTH 3 (THREE) TIMES A DAY AS NEEDED (ABDOMINAL PAIN), Disp: 90 tablet, Rfl: 0    famotidine (PEPCID) 40 MG tablet, TAKE 1 TABLET AT BEDTIME , Disp: 90 tablet, Rfl: 2    ALLERGIES:  Allergies   Allergen Reactions    Penicillins Rash     Pt  Not sure what allergy is  REVIEW OF SYSTEMS:  MSK: as noted in HPI  Neuro: WNL  Pertinent items are otherwise noted in HPI  A comprehensive review of systems was otherwise negative  LABS:  HgA1c: No results found for: HGBA1C  BMP:   Lab Results   Component Value Date    CALCIUM 9 3 12/17/2019    K 3 7 12/17/2019    CO2 32 12/17/2019     12/17/2019    BUN 14 12/17/2019    CREATININE 0 69 12/17/2019     CBC: No components found for: CBC    _____________________________________________________  PHYSICAL EXAMINATION:  Vital signs: /87   Pulse 76   Wt 68 2 kg (150 lb 6 4 oz)   BMI 25 03 kg/m²   General: No acute distress, awake and alert  Psychiatric: Mood and affect appear appropriate  HEENT: Trachea Midline, No torticollis, no apparent facial trauma  Cardiovascular: No audible murmurs;  Extremities appear perfused  Pulmonary: No audible wheezing or stridor  Skin: No open lesions; see further details (if any) below    MUSCULOSKELETAL EXAMINATION:  Extremities:  Left shoulder:   No erythema, ecchymosis or edema  Full forward flexion   Flexion strength 4-/5  External strength 5/5  - belly press   Pain with resisted forward flexion     _____________________________________________________  STUDIES REVIEWED:  I personally reviewed the images and interpretation is as follows:  X-ray left shoulder demonstrates no acute fracture or dislocation  X-ray left humerus demonstrates no acute fracture or dislocation  PROCEDURES PERFORMED:  Large joint arthrocentesis: L subacromial bursa  Universal Protocol:  Consent: Verbal consent obtained  Written consent not obtained    Risks and benefits: risks, benefits and alternatives were discussed  Consent given by: patient  Site marked: the operative site was marked  Patient identity confirmed: verbally with patient    Supporting Documentation  Indications: pain   Procedure Details  Location: shoulder - L subacromial bursa  Preparation: Patient was prepped and draped in the usual sterile fashion  Needle size: 22 G  Ultrasound guidance: no  Medications administered: 2 mL bupivacaine 0 25 %; 80 mg triamcinolone acetonide 40 mg/mL    Patient tolerance: patient tolerated the procedure well with no immediate complications  Dressing:  Sterile dressing applied        Scribe Attestation    I,:  Sandra Tapia am acting as a scribe while in the presence of the attending physician :       I,:  Suman Cruz MD personally performed the services described in this documentation    as scribed in my presence :

## 2022-03-17 ENCOUNTER — APPOINTMENT (OUTPATIENT)
Dept: LAB | Facility: CLINIC | Age: 67
End: 2022-03-17
Payer: MEDICARE

## 2022-03-17 DIAGNOSIS — E78.5 HYPERLIPIDEMIA, UNSPECIFIED HYPERLIPIDEMIA TYPE: ICD-10-CM

## 2022-03-17 DIAGNOSIS — R10.30 LOWER ABDOMINAL PAIN: ICD-10-CM

## 2022-03-17 DIAGNOSIS — Z13.29 THYROID DISORDER SCREEN: ICD-10-CM

## 2022-03-17 LAB
ALBUMIN SERPL BCP-MCNC: 3.8 G/DL (ref 3.5–5)
ALP SERPL-CCNC: 55 U/L (ref 46–116)
ALT SERPL W P-5'-P-CCNC: 13 U/L (ref 12–78)
ANION GAP SERPL CALCULATED.3IONS-SCNC: 4 MMOL/L (ref 4–13)
AST SERPL W P-5'-P-CCNC: 16 U/L (ref 5–45)
BASOPHILS # BLD AUTO: 0.07 THOUSANDS/ΜL (ref 0–0.1)
BASOPHILS NFR BLD AUTO: 2 % (ref 0–1)
BILIRUB SERPL-MCNC: 0.56 MG/DL (ref 0.2–1)
BUN SERPL-MCNC: 14 MG/DL (ref 5–25)
CALCIUM SERPL-MCNC: 9.2 MG/DL (ref 8.3–10.1)
CHLORIDE SERPL-SCNC: 105 MMOL/L (ref 100–108)
CHOLEST SERPL-MCNC: 260 MG/DL
CO2 SERPL-SCNC: 30 MMOL/L (ref 21–32)
CREAT SERPL-MCNC: 0.85 MG/DL (ref 0.6–1.3)
CRP SERPL QL: <3 MG/L
EOSINOPHIL # BLD AUTO: 0.18 THOUSAND/ΜL (ref 0–0.61)
EOSINOPHIL NFR BLD AUTO: 4 % (ref 0–6)
ERYTHROCYTE [DISTWIDTH] IN BLOOD BY AUTOMATED COUNT: 12.6 % (ref 11.6–15.1)
GFR SERPL CREATININE-BSD FRML MDRD: 72 ML/MIN/1.73SQ M
GLUCOSE P FAST SERPL-MCNC: 83 MG/DL (ref 65–99)
HCT VFR BLD AUTO: 40.7 % (ref 34.8–46.1)
HDLC SERPL-MCNC: 87 MG/DL
HGB BLD-MCNC: 14.1 G/DL (ref 11.5–15.4)
IGA SERPL-MCNC: 183 MG/DL (ref 70–400)
IMM GRANULOCYTES # BLD AUTO: 0.01 THOUSAND/UL (ref 0–0.2)
IMM GRANULOCYTES NFR BLD AUTO: 0 % (ref 0–2)
LDLC SERPL CALC-MCNC: 163 MG/DL (ref 0–100)
LYMPHOCYTES # BLD AUTO: 0.78 THOUSANDS/ΜL (ref 0.6–4.47)
LYMPHOCYTES NFR BLD AUTO: 17 % (ref 14–44)
MCH RBC QN AUTO: 31.9 PG (ref 26.8–34.3)
MCHC RBC AUTO-ENTMCNC: 34.6 G/DL (ref 31.4–37.4)
MCV RBC AUTO: 92 FL (ref 82–98)
MONOCYTES # BLD AUTO: 0.33 THOUSAND/ΜL (ref 0.17–1.22)
MONOCYTES NFR BLD AUTO: 7 % (ref 4–12)
NEUTROPHILS # BLD AUTO: 3.37 THOUSANDS/ΜL (ref 1.85–7.62)
NEUTS SEG NFR BLD AUTO: 70 % (ref 43–75)
NONHDLC SERPL-MCNC: 173 MG/DL
NRBC BLD AUTO-RTO: 0 /100 WBCS
PLATELET # BLD AUTO: 261 THOUSANDS/UL (ref 149–390)
PMV BLD AUTO: 9.7 FL (ref 8.9–12.7)
POTASSIUM SERPL-SCNC: 3.8 MMOL/L (ref 3.5–5.3)
PROT SERPL-MCNC: 7 G/DL (ref 6.4–8.2)
RBC # BLD AUTO: 4.42 MILLION/UL (ref 3.81–5.12)
SODIUM SERPL-SCNC: 139 MMOL/L (ref 136–145)
TRIGL SERPL-MCNC: 49 MG/DL
TSH SERPL DL<=0.05 MIU/L-ACNC: 1.12 UIU/ML (ref 0.36–3.74)
WBC # BLD AUTO: 4.74 THOUSAND/UL (ref 4.31–10.16)

## 2022-03-17 PROCEDURE — 84443 ASSAY THYROID STIM HORMONE: CPT

## 2022-03-17 PROCEDURE — 80053 COMPREHEN METABOLIC PANEL: CPT

## 2022-03-17 PROCEDURE — 80061 LIPID PANEL: CPT

## 2022-03-17 PROCEDURE — 82784 ASSAY IGA/IGD/IGG/IGM EACH: CPT

## 2022-03-17 PROCEDURE — 86140 C-REACTIVE PROTEIN: CPT

## 2022-03-17 PROCEDURE — 86364 TISS TRNSGLTMNASE EA IG CLAS: CPT

## 2022-03-17 PROCEDURE — 36415 COLL VENOUS BLD VENIPUNCTURE: CPT

## 2022-03-17 PROCEDURE — 85025 COMPLETE CBC W/AUTO DIFF WBC: CPT

## 2022-03-18 LAB — TTG IGA SER-ACNC: <2 U/ML (ref 0–3)

## 2022-03-23 NOTE — PROGRESS NOTES
Assessment and Plan:     Problem List Items Addressed This Visit     None           Preventive health issues were discussed with patient, and age appropriate screening tests were ordered as noted in patient's After Visit Summary  Personalized health advice and appropriate referrals for health education or preventive services given if needed, as noted in patient's After Visit Summary  History of Present Illness:     Patient presents for Welcome to Medicare visit  Patient Care Team:  Benjamin Travis MD as PCP - General (Family Medicine)  MD Ramana Ryan MD as Endoscopist     Review of Systems:     Review of Systems   Problem List:     Patient Active Problem List   Diagnosis    Closed nondisplaced fracture of left patella    Rotator cuff tendinitis, left      Past Medical and Surgical History:     Past Medical History:   Diagnosis Date    GERD (gastroesophageal reflux disease)     Hyperlipidemia     Insomnia      Past Surgical History:   Procedure Laterality Date    BREAST LUMPECTOMY      lumpectomy, resolved 1999    COLONOSCOPY      resolved 2008    NM ESOPHAGOGASTRODUODENOSCOPY TRANSORAL DIAGNOSTIC N/A 6/8/2017    Procedure: EGD AND COLONOSCOPY;  Surgeon: Ramana Sumner MD;  Location: [facility]; Service: Gastroenterology      Family History:     Family History   Problem Relation Age of Onset   Mckinley Abt Parkinsonism Mother     Diabetes Father         DM    Cancer Father         urinary bladder    Hypertension Father       Social History:     Social History     Socioeconomic History    Marital status:      Spouse name: Not on file    Number of children: Not on file    Years of education: Not on file    Highest education level: Not on file   Occupational History    Not on file   Tobacco Use    Smoking status: Never Smoker    Smokeless tobacco: Never Used   Vaping Use    Vaping Use: Never used   Substance and Sexual Activity    Alcohol use:  Yes     Alcohol/week: 1 0 standard drink     Types: 1 Glasses of wine per week     Comment: one glass a day    Drug use: No    Sexual activity: Not on file   Other Topics Concern    Not on file   Social History Narrative    Caffeine use     Social Determinants of Health     Financial Resource Strain: Not on file   Food Insecurity: Not on file   Transportation Needs: Not on file   Physical Activity: Not on file   Stress: Not on file   Social Connections: Not on file   Intimate Partner Violence: Not on file   Housing Stability: Not on file      Medications and Allergies:     Current Outpatient Medications   Medication Sig Dispense Refill    dicyclomine (BENTYL) 20 mg tablet TAKE 1 TABLET (20 MG TOTAL) BY MOUTH 3 (THREE) TIMES A DAY AS NEEDED (ABDOMINAL PAIN) 90 tablet 0    famotidine (PEPCID) 40 MG tablet TAKE 1 TABLET AT BEDTIME  90 tablet 2    ibuprofen (MOTRIN) 200 mg tablet Take by mouth every 6 (six) hours as needed for mild pain      Turmeric 500 MG CAPS Take by mouth       No current facility-administered medications for this visit  Allergies   Allergen Reactions    Penicillins Rash     Pt  Not sure what allergy is        Immunizations:     Immunization History   Administered Date(s) Administered    COVID-19 MODERNA VACC 0 5 ML IM 05/16/2021, 06/13/2021    INFLUENZA 11/15/2021    Influenza Quadrivalent Preservative Free 3 years and older IM 12/21/2016    Influenza, recombinant, quadrivalent,injectable, preservative free 12/17/2019, 12/14/2020      Health Maintenance:         Topic Date Due    Hepatitis C Screening  Never done    HIV Screening  Never done    Cervical Cancer Screening  Never done    DXA SCAN  02/23/2022    Breast Cancer Screening: Mammogram  12/15/2022    Colorectal Cancer Screening  05/04/2026         Topic Date Due    DTaP,Tdap,and Td Vaccines (1 - Tdap) Never done    Pneumococcal Vaccine: 65+ Years (1 of 1 - PPSV23) Never done    COVID-19 Vaccine (3 - Booster for Moderna series) 11/13/2021 Medicare Screening Tests and Risk Assessments:     Eugenia Tomlin is here for her Subsequent Wellness visit  Health Risk Assessment:   Patient rates overall health as excellent  Patient feels that their physical health rating is same  Patient is satisfied with their life  Eyesight was rated as slightly worse  Hearing was rated as same  Patient feels that their emotional and mental health rating is same  Patients states they are never, rarely angry  Patient states they are never, rarely unusually tired/fatigued  Pain experienced in the last 7 days has been some  Patient's pain rating has been 3/10  Patient states that she has experienced no weight loss or gain in last 6 months  Depression Screening:   PHQ-2 Score: 2      Fall Risk Screening: In the past year, patient has experienced: history of falling in past year    Number of falls: 1  Injured during fall?: Yes    Feels unsteady when standing or walking?: No    Worried about falling?: No      Urinary Incontinence Screening:   Patient has leaked urine accidently in the last six months  Home Safety:  Patient does not have trouble with stairs inside or outside of their home  Patient has working smoke alarms and has working carbon monoxide detector  Home safety hazards include: none  Nutrition:   Current diet is Regular, Low Saturated Fat and Limited junk food  Eats healthy  Caffeine 1 daily  Dietary calcium some; takes Ca supplement  Walks, active with job    Medications:   Patient is currently taking over-the-counter supplements  OTC medications include: B12, Calcium, Tumeric, Vitamin C, B-complex  Patient is able to manage medications  Activities of Daily Living (ADLs)/Instrumental Activities of Daily Living (IADLs):   Walk and transfer into and out of bed and chair?: Yes  Dress and groom yourself?: Yes    Bathe or shower yourself?: Yes    Feed yourself?  Yes  Do your laundry/housekeeping?: Yes  Manage your money, pay your bills and track your expenses?: Yes  Make your own meals?: Yes    Do your own shopping?: Yes    Previous Hospitalizations:   Any hospitalizations or ED visits within the last 12 months?: No      Advance Care Planning:   Living will: No    Durable POA for healthcare: No    Advanced directive: No    End of Life Decisions reviewed with patient: Yes      Cognitive Screening:   Provider or family/friend/caregiver concerned regarding cognition?: No    PREVENTIVE SCREENINGS      Cardiovascular Screening:    General: Screening Not Indicated and History Lipid Disorder      Diabetes Screening:     General: Screening Current      Colorectal Cancer Screening:     General: Screening Current      Breast Cancer Screening:     General: Screening Current      Cervical Cancer Screening:    General: Screening Not Indicated, Risks and Benefits Discussed and Screening Current      Osteoporosis Screening:    General: Screening Current      Abdominal Aortic Aneurysm (AAA) Screening:        General: Screening Not Indicated      Lung Cancer Screening:     General: Screening Not Indicated      Hepatitis C Screening:    General: Risks and Benefits Discussed    Hep C Screening Accepted: Yes        Preventive Screening Comments: Eye checkup due  [employer]  Colonoscopy   [facility]A scan was done in  per [facility]  Immunizations updated  Head: Vaccine with booster  Prevnar today  Discussed Shingrix  and Tdap  Screening, Brief Intervention, and Referral to Treatment (SBIRT)    Screening  Typical number of drinks in a day: 0  Typical number of drinks in a week: 1  Interpretation: Low risk drinking behavior  AUDIT-C Screenin) How often did you have a drink containing alcohol in the past year? 4 or more times a week  2) How many drinks did you have on a typical day when you were drinking in the past year?  1 to 2  3) How often did you have 6 or more drinks on one occasion in the past year? never    AUDIT-C Score: 4  Interpretation: Score 3-12 (female): POSITIVE screen for alcohol misuse    AUDIT Screenin) How often during the last year have you found that you were not able to stop drinking once you had started? 0 - never  5) How often during the last year have you failed to do what was normally expected from you because of drinking? 0 - never  6) How often during the last year have you needed a first drink in the morning to get yourself going after a heavy drinking session? 0 - never  7) How often during the last year have you had a feeling of guilt or remorse after drinking? 0 - never  8) How often during the last year have you been unable to remember what happened the night before because you had been drinking? 0 - never  9) Have you or someone else been injured as a result of your drinking? 0 - no  10) Has a relative or friend or a doctor or another health worker been concerned about your drinking or suggested you cut down? 0 - no    AUDIT Score: 4  Interpretation: Low risk alcohol consumption    Single Item Drug Screening:  How often have you used an illegal drug (including marijuana) or a prescription medication for non-medical reasons in the past year? never    Single Item Drug Screen Score: 0  Interpretation: Negative screen for possible drug use disorder    Other Counseling Topics:   Calcium and vitamin D intake and regular weightbearing exercise  No exam data present     Physical Exam:     There were no vitals taken for this visit  Physical Exam  Constitutional:       Appearance: She is well-developed  HENT:      Head: Normocephalic  Right Ear: Tympanic membrane and external ear normal       Left Ear: Tympanic membrane and external ear normal       Nose: Nose normal    Eyes:      General: No scleral icterus  Conjunctiva/sclera: Conjunctivae normal       Pupils: Pupils are equal, round, and reactive to light  Neck:      Thyroid: No thyroid mass or thyromegaly  Vascular: No carotid bruit     Cardiovascular: Rate and Rhythm: Normal rate and regular rhythm  Pulses:           Femoral pulses are 2+ on the right side and 2+ on the left side  Popliteal pulses are 2+ on the right side and 2+ on the left side  Dorsalis pedis pulses are 2+ on the right side and 2+ on the left side  Posterior tibial pulses are 2+ on the right side and 2+ on the left side  Heart sounds: Normal heart sounds  Pulmonary:      Effort: Pulmonary effort is normal  No respiratory distress  Breath sounds: Normal breath sounds  No wheezing or rales  Chest:   Breasts:      Right: No supraclavicular adenopathy  Left: No supraclavicular adenopathy  Abdominal:      General: Bowel sounds are normal  There is no distension  Palpations: Abdomen is soft  There is no mass  Tenderness: There is no abdominal tenderness  Musculoskeletal:         General: Normal range of motion  Cervical back: Normal range of motion and neck supple  Lymphadenopathy:      Head:      Right side of head: No submandibular or occipital adenopathy  Left side of head: No submandibular or occipital adenopathy  Cervical: No cervical adenopathy  Upper Body:      Right upper body: No supraclavicular adenopathy  Left upper body: No supraclavicular adenopathy  Skin:     Findings: No lesion or rash  Neurological:      Mental Status: She is oriented to person, place, and time  Psychiatric:         Behavior: Behavior normal         /70 (Cuff Size: Standard)   Pulse 63   Temp 98 6 °F (37 °C)   Resp 16   Ht 5' 5" (1 651 m)   Wt 66 1 kg (145 lb 12 8 oz)   BMI 24 26 kg/m²      Assessment  Annual Medicare wellness    Plan  Prevnar  Shingles  Tdap  Hepatitis-C with next blood work    Dante Quintanilla MD

## 2022-03-23 NOTE — PATIENT INSTRUCTIONS
Medicare Preventive Visit Patient Instructions  Thank you for completing your Welcome to Medicare Visit or Medicare Annual Wellness Visit today  Your next wellness visit will be due in one year (3/24/2023)  The screening/preventive services that you may require over the next 5-10 years are detailed below  Some tests may not apply to you based off risk factors and/or age  Screening tests ordered at today's visit but not completed yet may show as past due  Also, please note that scanned in results may not display below  Preventive Screenings:  Service Recommendations Previous Testing/Comments   Colorectal Cancer Screening  * Colonoscopy    * Fecal Occult Blood Test (FOBT)/Fecal Immunochemical Test (FIT)  * Fecal DNA/Cologuard Test  * Flexible Sigmoidoscopy Age: 54-65 years old   Colonoscopy: every 10 years (may be performed more frequently if at higher risk)  OR  FOBT/FIT: every 1 year  OR  Cologuard: every 3 years  OR  Sigmoidoscopy: every 5 years  Screening may be recommended earlier than age 48 if at higher risk for colorectal cancer  Also, an individualized decision between you and your healthcare provider will decide whether screening between the ages of 74-80 would be appropriate  Colonoscopy: 05/04/2021  FOBT/FIT: Not on file  Cologuard: Not on file  Sigmoidoscopy: Not on file    Screening Current     Breast Cancer Screening Age: 36 years old  Frequency: every 1-2 years  Not required if history of left and right mastectomy Mammogram: 12/15/2021    Screening Current   Cervical Cancer Screening Between the ages of 21-29, pap smear recommended once every 3 years  Between the ages of 33-67, can perform pap smear with HPV co-testing every 5 years     Recommendations may differ for women with a history of total hysterectomy, cervical cancer, or abnormal pap smears in past  Pap Smear: Not on file    Screening Not Indicated   Hepatitis C Screening Once for adults born between 1945 and 1965  More frequently in patients at high risk for Hepatitis C Hep C Antibody: Not on file        Diabetes Screening 1-2 times per year if you're at risk for diabetes or have pre-diabetes Fasting glucose: 83 mg/dL   A1C: No results in last 5 years    Screening Current   Cholesterol Screening Once every 5 years if you don't have a lipid disorder  May order more often based on risk factors  Lipid panel: 03/17/2022    Screening Not Indicated  History Lipid Disorder     Other Preventive Screenings Covered by Medicare:  1  Abdominal Aortic Aneurysm (AAA) Screening: covered once if your at risk  You're considered to be at risk if you have a family history of AAA  2  Lung Cancer Screening: covers low dose CT scan once per year if you meet all of the following conditions: (1) Age 50-69; (2) No signs or symptoms of lung cancer; (3) Current smoker or have quit smoking within the last 15 years; (4) You have a tobacco smoking history of at least 30 pack years (packs per day multiplied by number of years you smoked); (5) You get a written order from a healthcare provider  3  Glaucoma Screening: covered annually if you're considered high risk: (1) You have diabetes OR (2) Family history of glaucoma OR (3)  aged 48 and older OR (3)  American aged 72 and older  3  Osteoporosis Screening: covered every 2 years if you meet one of the following conditions: (1) You're estrogen deficient and at risk for osteoporosis based off medical history and other findings; (2) Have a vertebral abnormality; (3) On glucocorticoid therapy for more than 3 months; (4) Have primary hyperparathyroidism; (5) On osteoporosis medications and need to assess response to drug therapy  · Last bone density test (DXA Scan): 02/23/2017  5  HIV Screening: covered annually if you're between the age of 12-76  Also covered annually if you are younger than 13 and older than 72 with risk factors for HIV infection   For pregnant patients, it is covered up to 3 times per pregnancy  Immunizations:  Immunization Recommendations   Influenza Vaccine Annual influenza vaccination during flu season is recommended for all persons aged >= 6 months who do not have contraindications   Pneumococcal Vaccine (Prevnar and Pneumovax)  * Prevnar = PCV13  * Pneumovax = PPSV23   Adults 25-60 years old: 1-3 doses may be recommended based on certain risk factors  Adults 72 years old: Prevnar (PCV13) vaccine recommended followed by Pneumovax (PPSV23) vaccine  If already received PPSV23 since turning 65, then PCV13 recommended at least one year after PPSV23 dose  Hepatitis B Vaccine 3 dose series if at intermediate or high risk (ex: diabetes, end stage renal disease, liver disease)   Tetanus (Td) Vaccine - COST NOT COVERED BY MEDICARE PART B Following completion of primary series, a booster dose should be given every 10 years to maintain immunity against tetanus  Td may also be given as tetanus wound prophylaxis  Tdap Vaccine - COST NOT COVERED BY MEDICARE PART B Recommended at least once for all adults  For pregnant patients, recommended with each pregnancy  Shingles Vaccine (Shingrix) - COST NOT COVERED BY MEDICARE PART B  2 shot series recommended in those aged 48 and above     Health Maintenance Due:      Topic Date Due    Hepatitis C Screening  Never done    HIV Screening  Never done    Cervical Cancer Screening  Never done    DXA SCAN  02/23/2022    Breast Cancer Screening: Mammogram  12/15/2022    Colorectal Cancer Screening  05/04/2026     Immunizations Due:      Topic Date Due    DTaP,Tdap,and Td Vaccines (1 - Tdap) Never done    Pneumococcal Vaccine: 65+ Years (1 of 1 - PPSV23) Never done    COVID-19 Vaccine (3 - Booster for Clive Longest series) 11/13/2021     Advance Directives   What are advance directives? Advance directives are legal documents that state your wishes and plans for medical care   These plans are made ahead of time in case you lose your ability to make decisions for yourself  Advance directives can apply to any medical decision, such as the treatments you want, and if you want to donate organs  What are the types of advance directives? There are many types of advance directives, and each state has rules about how to use them  You may choose a combination of any of the following:  · Living will: This is a written record of the treatment you want  You can also choose which treatments you do not want, which to limit, and which to stop at a certain time  This includes surgery, medicine, IV fluid, and tube feedings  · Durable power of  for healthcare [facility]): This is a written record that states who you want to make healthcare choices for you when you are unable to make them for yourself  This person, called a proxy, is usually a family member or a friend  You may choose more than 1 proxy  · Do not resuscitate (DNR) order:  A DNR order is used in case your heart stops beating or you stop breathing  It is a request not to have certain forms of treatment, such as CPR  A DNR order may be included in other types of advance directives  · Medical directive: This covers the care that you want if you are in a coma, near death, or unable to make decisions for yourself  You can list the treatments you want for each condition  Treatment may include pain medicine, surgery, blood transfusions, dialysis, IV or tube feedings, and a ventilator (breathing machine)  · Values history: This document has questions about your views, beliefs, and how you feel and think about life  This information can help others choose the care that you would choose  Why are advance directives important? An advance directive helps you control your care  Although spoken wishes may be used, it is better to have your wishes written down  Spoken wishes can be misunderstood, or not followed  Treatments may be given even if you do not want them   An advance directive may make it easier for your family to make difficult choices about your care  Fall Prevention    Fall prevention  includes ways to make your home and other areas safer  It also includes ways you can move more carefully to prevent a fall  Health conditions that cause changes in your blood pressure, vision, or muscle strength and coordination may increase your risk for falls  Medicines may also increase your risk for falls if they make you dizzy, weak, or sleepy  Fall prevention tips:   · Stand or sit up slowly  · Use assistive devices as directed  · Wear shoes that fit well and have soles that   · Wear a personal alarm  · Stay active  · Manage your medical conditions  Home Safety Tips:  · Add items to prevent falls in the bathroom  · Keep paths clear  · Install bright lights in your home  · Keep items you use often on shelves within reach  · Paint or place reflective tape on the edges of your stairs  Urinary Incontinence   Urinary incontinence (UI)  is when you lose control of your bladder  UI develops because your bladder cannot store or empty urine properly  The 3 most common types of UI are stress incontinence, urge incontinence, or both  Medicines:   · May be given to help strengthen your bladder control  Report any side effects of medication to your healthcare provider  Do pelvic muscle exercises often:  Your pelvic muscles help you stop urinating  Squeeze these muscles tight for 5 seconds, then relax for 5 seconds  Gradually work up to squeezing for 10 seconds  Do 3 sets of 15 repetitions a day, or as directed  This will help strengthen your pelvic muscles and improve bladder control  Train your bladder:  Go to the bathroom at set times, such as every 2 hours, even if you do not feel the urge to go  You can also try to hold your urine when you feel the urge to go  For example, hold your urine for 5 minutes when you feel the urge to go  As that becomes easier, hold your urine for 10 minutes  Self-care:   · Keep a UI record  Write down how often you leak urine and how much you leak  Make a note of what you were doing when you leaked urine  · Drink liquids as directed  You may need to limit the amount of liquid you drink to help control your urine leakage  Do not drink any liquid right before you go to bed  Limit or do not have drinks that contain caffeine or alcohol  · Prevent constipation  Eat a variety of high-fiber foods  Good examples are high-fiber cereals, beans, vegetables, and whole-grain breads  Walking is the best way to trigger your intestines to have a bowel movement  · Exercise regularly and maintain a healthy weight  Weight loss and exercise will decrease pressure on your bladder and help you control your leakage  · Use a catheter as directed  to help empty your bladder  A catheter is a tiny, plastic tube that is put into your bladder to drain your urine  · Go to behavior therapy as directed  Behavior therapy may be used to help you learn to control your urge to urinate  Weight Management   Why it is important to manage your weight:  Being overweight increases your risk of health conditions such as heart disease, high blood pressure, type 2 diabetes, and certain types of cancer  It can also increase your risk for osteoarthritis, sleep apnea, and other respiratory problems  Aim for a slow, steady weight loss  Even a small amount of weight loss can lower your risk of health problems  How to lose weight safely:  A safe and healthy way to lose weight is to eat fewer calories and get regular exercise  You can lose up about 1 pound a week by decreasing the number of calories you eat by 500 calories each day  Healthy meal plan for weight management:  A healthy meal plan includes a variety of foods, contains fewer calories, and helps you stay healthy  A healthy meal plan includes the following:  · Eat whole-grain foods more often  A healthy meal plan should contain fiber   Fiber is the part of grains, fruits, and vegetables that is not broken down by your body  Whole-grain foods are healthy and provide extra fiber in your diet  Some examples of whole-grain foods are whole-wheat breads and pastas, oatmeal, brown rice, and bulgur  · Eat a variety of vegetables every day  Include dark, leafy greens such as spinach, kale, vitaliy greens, and mustard greens  Eat yellow and orange vegetables such as carrots, sweet potatoes, and winter squash  · Eat a variety of fruits every day  Choose fresh or canned fruit (canned in its own juice or light syrup) instead of juice  Fruit juice has very little or no fiber  · Eat low-fat dairy foods  Drink fat-free (skim) milk or 1% milk  Eat fat-free yogurt and low-fat cottage cheese  Try low-fat cheeses such as mozzarella and other reduced-fat cheeses  · Choose meat and other protein foods that are low in fat  Choose beans or other legumes such as split peas or lentils  Choose fish, skinless poultry (chicken or turkey), or lean cuts of red meat (beef or pork)  Before you cook meat or poultry, cut off any visible fat  · Use less fat and oil  Try baking foods instead of frying them  Add less fat, such as margarine, sour cream, regular salad dressing and mayonnaise to foods  Eat fewer high-fat foods  Some examples of high-fat foods include french fries, doughnuts, ice cream, and cakes  · Eat fewer sweets  Limit foods and drinks that are high in sugar  This includes candy, cookies, regular soda, and sweetened drinks  Exercise:  Exercise at least 30 minutes per day on most days of the week  Some examples of exercise include walking, biking, dancing, and swimming  You can also fit in more physical activity by taking the stairs instead of the elevator or parking farther away from stores  Ask your healthcare provider about the best exercise plan for you  Alcohol Use and Your Health    Drinking too much can harm your health    Excessive alcohol use leads to about 88,000 death in the [place] each year, and shortens the life of those who diet by almost 30 years  Further, excessive drinking cost the economy $249 billion in 2010  Most excessive drinkers are not alcohol dependent  Excessive alcohol use has immediate effects that increase the risk of many harmful health conditions  These are most often the result of binge drinking  Over time, excessive alcohol use can lead to the development of chronic diseases and other series health problems  What is considered a "drink"? Excessive alcohol use includes:  · Binge Drinking: For women, 4 or more drinks consumed on one occasion  For men, 5 or more drinks consumed on one occasion  · Heavy Drinking: For women, 8 or more drinks per week  For men, 15 or more drinks per week  · Any alcohol used by pregnant women  · Any alcohol used by those under the age of 21 years    If you choose to drink, do so in moderation:  · Do not drink at all if you are under the age of 24, or if you are or may be pregnant, or have health problems that could be made worse by drinking    · For women, up to 1 drink per day  · For men, up to 2 drinks a day    No one should begin drinking or drink more frequently based on potential health benefits    Short-Term Health Risks:  · Injuries: motor vehicle crashes, falls, drownings, burns  · Violence: homicide, suicide, sexual assault, intimate partner violence  · Alcohol poisoning  · Reproductive health: risky sexual behaviors, unintended prengnacy, sexually transmitted diseases, miscarriage, stillbirth, fetal alcohol syndrome    Long-Term Health Risks:  · Chronic diseases: high blood pressure, heart disease, stroke, liver disease, digestive problems  · Cancers: breast, mouth and throat, liver, colon  · Learning and memory problems: dementia, poor school performance  · Mental health: depression, anxiety, insomnia  · Social problems: lost productivity, family problems, unemployment  · Alcohol dependence    For support and more information:  · Substance Abuse and [address] [place]  Web Address: https://[employer]/    · Alcoholics Anonymous        Web Address: http://www HearMe[employer]/    https://www cdc gov/alcohol/fact-sheets/alcohol-use htm     © [address] [facility] 2018 Information is for End User's use only and may not be sold, redistributed or otherwise used for commercial purposes   All illustrations and images included in CareNotes® are the copyrighted property of A D A M , Inc  or [address] [place] [address]

## 2022-03-28 ENCOUNTER — OFFICE VISIT (OUTPATIENT)
Dept: FAMILY MEDICINE CLINIC | Facility: MEDICAL CENTER | Age: 67
End: 2022-03-28
Payer: MEDICARE

## 2022-03-28 VITALS
TEMPERATURE: 98.6 F | SYSTOLIC BLOOD PRESSURE: 110 MMHG | DIASTOLIC BLOOD PRESSURE: 70 MMHG | WEIGHT: 145.8 LBS | RESPIRATION RATE: 16 BRPM | HEIGHT: 65 IN | HEART RATE: 63 BPM | BODY MASS INDEX: 24.29 KG/M2

## 2022-03-28 DIAGNOSIS — Z23 NEED FOR DIPHTHERIA-TETANUS-PERTUSSIS (TDAP) VACCINE: ICD-10-CM

## 2022-03-28 DIAGNOSIS — Z23 NEED FOR SHINGLES VACCINE: ICD-10-CM

## 2022-03-28 DIAGNOSIS — Z11.59 NEED FOR HEPATITIS C SCREENING TEST: ICD-10-CM

## 2022-03-28 DIAGNOSIS — E78.5 HYPERLIPIDEMIA, UNSPECIFIED HYPERLIPIDEMIA TYPE: ICD-10-CM

## 2022-03-28 DIAGNOSIS — Z11.59 ENCOUNTER FOR HEPATITIS C SCREENING TEST FOR LOW RISK PATIENT: Primary | ICD-10-CM

## 2022-03-28 DIAGNOSIS — Z23 IMMUNIZATION DUE: ICD-10-CM

## 2022-03-28 DIAGNOSIS — R10.30 LOWER ABDOMINAL PAIN: ICD-10-CM

## 2022-03-28 DIAGNOSIS — Z00.00 MEDICARE ANNUAL WELLNESS VISIT, SUBSEQUENT: ICD-10-CM

## 2022-03-28 PROCEDURE — G0402 INITIAL PREVENTIVE EXAM: HCPCS | Performed by: FAMILY MEDICINE

## 2022-03-28 PROCEDURE — 90670 PCV13 VACCINE IM: CPT | Performed by: FAMILY MEDICINE

## 2022-03-28 PROCEDURE — G0009 ADMIN PNEUMOCOCCAL VACCINE: HCPCS | Performed by: FAMILY MEDICINE

## 2022-03-28 PROCEDURE — 1123F ACP DISCUSS/DSCN MKR DOCD: CPT | Performed by: FAMILY MEDICINE

## 2022-03-28 PROCEDURE — 99214 OFFICE O/P EST MOD 30 MIN: CPT | Performed by: FAMILY MEDICINE

## 2022-03-28 RX ORDER — NEOMYCIN SULFATE, POLYMYXIN B SULFATE AND DEXAMETHASONE 3.5; 10000; 1 MG/ML; [USP'U]/ML; MG/ML
SUSPENSION/ DROPS OPHTHALMIC
COMMUNITY
Start: 2022-03-14

## 2022-03-28 RX ORDER — ZOSTER VACCINE RECOMBINANT, ADJUVANTED 50 MCG/0.5
0.5 KIT INTRAMUSCULAR ONCE
Qty: 1 EACH | Refills: 1 | Status: SHIPPED | OUTPATIENT
Start: 2022-03-28 | End: 2022-03-28

## 2022-03-28 NOTE — PROGRESS NOTES
Larry Corea complais of lower to mied abdominal cramping  Takiing metamucil    Agggravated by leaning forward  Gets daily  Relieved by BM  Recenlty straining  Stool is hard  Started Metamucil which helps  She discussed similar symptoms which GI last year  Dr Curt Lassiter  She was given a prescription for Bentyl which she found helpful  She saw her gyn for routine gyn exam recently  Dr Be   He advised pelvic ultrasound which she will be doing soon  She has been  under lot of stress with her partner who was undergoing cancer treatment  She is working 2 jobs as well as caring for him  She occasionally feels overwhelmed and fatigued although feels like she is managing it fairly well at this  time  I think she took Zoloft many years ago and did well with this until the dose was increased   O: /70 (Cuff Size: Standard)   Pulse 63   Temp 98 6 °F (37 °C)   Resp 16   Ht 5' 5" (1 651 m)   Wt 66 1 kg (145 lb 12 8 oz)   BMI 24 26 kg/m²    Physical Exam  Constitutional:       Appearance: She is well-developed  HENT:      Head: Normocephalic  Right Ear: Tympanic membrane and external ear normal       Left Ear: Tympanic membrane and external ear normal       Nose: Nose normal    Eyes:      General: No scleral icterus  Conjunctiva/sclera: Conjunctivae normal       Pupils: Pupils are equal, round, and reactive to light  Neck:      Thyroid: No thyroid mass or thyromegaly  Vascular: No carotid bruit  Cardiovascular:      Rate and Rhythm: Normal rate and regular rhythm  Pulses:           Femoral pulses are 2+ on the right side and 2+ on the left side  Popliteal pulses are 2+ on the right side and 2+ on the left side  Dorsalis pedis pulses are 2+ on the right side and 2+ on the left side  Posterior tibial pulses are 2+ on the right side and 2+ on the left side  Heart sounds: Normal heart sounds     Pulmonary:      Effort: Pulmonary effort is normal  No respiratory distress  Breath sounds: Normal breath sounds  No wheezing or rales  Chest:   Breasts:      Right: No supraclavicular adenopathy  Left: No supraclavicular adenopathy  Abdominal:      General: Bowel sounds are normal  There is no distension  Palpations: Abdomen is soft  There is no mass  Tenderness: There is no abdominal tenderness  Musculoskeletal:         General: Normal range of motion  Cervical back: Normal range of motion and neck supple  Lymphadenopathy:      Head:      Right side of head: No submandibular or occipital adenopathy  Left side of head: No submandibular or occipital adenopathy  Cervical: No cervical adenopathy  Upper Body:      Right upper body: No supraclavicular adenopathy  Left upper body: No supraclavicular adenopathy  Skin:     Findings: No lesion or rash  Neurological:      Mental Status: She is oriented to person, place, and time  Psychiatric:         Behavior: Behavior normal        Blood work  03/17/2022  Cholesterol 260  HDL 87    PBC TSH CMP normal    Assessment  1  Abdominal pain/alternating bowel movements-the IBS  Discussed high-fiber diet  Advise she follow-up with GI   2  Stress- discussed  Declines any treatment this time  Will continue to observe  Could consider trial of Zoloft   3  Hyperlipidemia-overall unchanged from previous  ASCVD risk is 3 9%  Continue to watch diet  Recheck 1 year  Plan  As per wellness  Recheck 1 year

## 2022-09-13 ENCOUNTER — OFFICE VISIT (OUTPATIENT)
Dept: OBGYN CLINIC | Facility: CLINIC | Age: 67
End: 2022-09-13
Payer: MEDICARE

## 2022-09-13 VITALS
HEIGHT: 65 IN | DIASTOLIC BLOOD PRESSURE: 77 MMHG | WEIGHT: 143.4 LBS | SYSTOLIC BLOOD PRESSURE: 117 MMHG | BODY MASS INDEX: 23.89 KG/M2 | HEART RATE: 81 BPM

## 2022-09-13 DIAGNOSIS — M75.82 ROTATOR CUFF TENDINITIS, LEFT: Primary | ICD-10-CM

## 2022-09-13 PROCEDURE — 99213 OFFICE O/P EST LOW 20 MIN: CPT | Performed by: ORTHOPAEDIC SURGERY

## 2022-09-13 PROCEDURE — 20610 DRAIN/INJ JOINT/BURSA W/O US: CPT | Performed by: ORTHOPAEDIC SURGERY

## 2022-09-13 RX ADMIN — BUPIVACAINE HYDROCHLORIDE 2 ML: 2.5 INJECTION, SOLUTION INFILTRATION; PERINEURAL at 16:11

## 2022-09-13 RX ADMIN — BETAMETHASONE SODIUM PHOSPHATE AND BETAMETHASONE ACETATE 12 MG: 3; 3 INJECTION, SUSPENSION INTRA-ARTICULAR; INTRALESIONAL; INTRAMUSCULAR; SOFT TISSUE at 16:11

## 2022-09-13 NOTE — PROGRESS NOTES
Orthopaedics Office Visit - Follow up Patient Visit    ASSESSMENT/PLAN:    Assessment:   Left shoulder rotator cuff tendinitis vs tear       Plan:   - Discussed conservative treatment and surgical intervention with patient at length  - Weight bearing as tolerated left upper extremity   - Offered patient cortisone injection  Patient accepted and tolerated well  - Begin physical therapy as directed   - Over the counter analgesics as needed / directed   - Ice / heat as directed   - Discussed possibility of additional imaging in the future pending symptoms next visit   - Follow up 3 months       To Do Next Visit:  Evaluate shoulder pain     _____________________________________________________  CHIEF COMPLAINT:  Chief Complaint   Patient presents with    Left Shoulder - Follow-up         SUBJECTIVE:  Colby Arias is a 73 y o  female who presents he also follow-up evaluation of her left shoulder  Patient states that the shoulder is doing slightly better overall in comparison to previous examination  Patient is 6 months status post cortisone injection to left shoulder  Patient states that her shoulder is doing well until approximately 2 months ago when a similar pain presented  Patient states that her pain is on the lateral aspect of the shoulder becomes worse with overhead activities and at night  Patient states she has been taking Tylenol as needed for pain with minimal relief of symptoms  Patient denies any new symptoms  Patient denies any injury elicit pain  Patient offers no other complaints at this time      PAST MEDICAL HISTORY:  Past Medical History:   Diagnosis Date    GERD (gastroesophageal reflux disease)     Hyperlipidemia     Insomnia        PAST SURGICAL HISTORY:  Past Surgical History:   Procedure Laterality Date    BREAST LUMPECTOMY      lumpectomy, resolved 1999    COLONOSCOPY      resolved 2008    NE ESOPHAGOGASTRODUODENOSCOPY TRANSORAL DIAGNOSTIC N/A 6/8/2017    Procedure: EGD AND COLONOSCOPY;  Surgeon: Chad Jimenez MD;  Location: [facility]; Service: Gastroenterology       FAMILY HISTORY:  Family History   Problem Relation Age of Onset   Jeanette Meza Parkinsonism Mother     Diabetes Father         DM    Cancer Father         urinary bladder    Hypertension Father        SOCIAL HISTORY:  Social History     Tobacco Use    Smoking status: Never Smoker    Smokeless tobacco: Never Used   Vaping Use    Vaping Use: Never used   Substance Use Topics    Alcohol use: Yes     Alcohol/week: 1 0 standard drink     Types: 1 Glasses of wine per week     Comment: one glass a day    Drug use: No       MEDICATIONS:    Current Outpatient Medications:     ibuprofen (MOTRIN) 200 mg tablet, Take by mouth every 6 (six) hours as needed for mild pain, Disp: , Rfl:     neomycin-polymyxin-dexamethasone (MAXITROL) ophthalmic suspension, INSTILL 1 DROP INTO RIGHT EYE 4 TIMES A DAY, Disp: , Rfl:     Turmeric 500 MG CAPS, Take by mouth, Disp: , Rfl:     ALLERGIES:  Allergies   Allergen Reactions    Penicillins Rash     Pt  Not sure what allergy is  REVIEW OF SYSTEMS:  MSK: left shoulder pain   Neuro: WNL   Pertinent items are otherwise noted in HPI  A comprehensive review of systems was otherwise negative  LABS:  HgA1c: No results found for: HGBA1C  BMP:   Lab Results   Component Value Date    CALCIUM 9 2 03/17/2022    K 3 8 03/17/2022    CO2 30 03/17/2022     03/17/2022    BUN 14 03/17/2022    CREATININE 0 85 03/17/2022     CBC: No components found for: CBC    _____________________________________________________  PHYSICAL EXAMINATION:  Vital signs: /77   Pulse 81   Ht 5' 5" (1 651 m)   Wt 65 kg (143 lb 6 4 oz)   BMI 23 86 kg/m²   General: No acute distress, awake and alert  Psychiatric: Mood and affect appear appropriate  HEENT: Trachea Midline, No torticollis, no apparent facial trauma  Cardiovascular: No audible murmurs;  Extremities appear perfused  Pulmonary: No audible wheezing or stridor  Skin: No open lesions; see further details (if any) below      MUSCULOSKELETAL EXAMINATION:  Left shoulder examination:  - Patient sitting comfortably in the office in no acute distress   - tenderness palpation noted greater tuberosity  No other bony or soft tissue tenderness palpation noted at this time  - 0-90 degrees of range of motion limited by pain for forward flexion and abduction  60° of internal rotation and 50° of external rotation present  - 4/5 strength noted in all planes of motion of the shoulder    - NV intact      _____________________________________________________  STUDIES REVIEWED:  I personally reviewed the images and interpretation is as follows:  N/A       PROCEDURES PERFORMED:  Large joint arthrocentesis: L subacromial bursa  Universal Protocol:  Consent: Verbal consent obtained    Risks and benefits: risks, benefits and alternatives were discussed  Consent given by: patient  Patient understanding: patient states understanding of the procedure being performed  Site marked: the operative site was marked  Patient identity confirmed: verbally with patient    Supporting Documentation  Indications: pain   Procedure Details  Location: shoulder - L subacromial bursa  Needle size: 22 G  Ultrasound guidance: no  Approach: lateral  Medications administered: 2 mL bupivacaine 0 25 %; 12 mg betamethasone acetate-betamethasone sodium phosphate 6 (3-3) mg/mL    Patient tolerance: patient tolerated the procedure well with no immediate complications  Dressing:  Sterile dressing applied              Williams Tang PA-C - Danielle Treviño MD

## 2022-09-13 NOTE — PATIENT INSTRUCTIONS
- Discussed conservative treatment and surgical intervention with patient at length  - Weight bearing as tolerated left upper extremity   - Offered patient cortisone injection  Patient accepted and tolerated well     - Begin physical therapy as directed   - Over the counter analgesics as needed / directed   - Ice / heat as directed   - Discussed possibility of additional imaging in the future pending symptoms next visit   - Follow up 3 months

## 2022-09-14 RX ORDER — BUPIVACAINE HYDROCHLORIDE 2.5 MG/ML
2 INJECTION, SOLUTION INFILTRATION; PERINEURAL
Status: COMPLETED | OUTPATIENT
Start: 2022-09-13 | End: 2022-09-13

## 2022-09-14 RX ORDER — BETAMETHASONE SODIUM PHOSPHATE AND BETAMETHASONE ACETATE 3; 3 MG/ML; MG/ML
12 INJECTION, SUSPENSION INTRA-ARTICULAR; INTRALESIONAL; INTRAMUSCULAR; SOFT TISSUE
Status: COMPLETED | OUTPATIENT
Start: 2022-09-13 | End: 2022-09-13

## 2022-09-21 ENCOUNTER — EVALUATION (OUTPATIENT)
Dept: PHYSICAL THERAPY | Facility: CLINIC | Age: 67
End: 2022-09-21
Payer: MEDICARE

## 2022-09-21 DIAGNOSIS — M75.82 ROTATOR CUFF TENDINITIS, LEFT: Primary | ICD-10-CM

## 2022-09-21 PROCEDURE — 97161 PT EVAL LOW COMPLEX 20 MIN: CPT

## 2022-09-21 PROCEDURE — 97110 THERAPEUTIC EXERCISES: CPT

## 2022-09-21 RX ORDER — MULTIVITAMIN WITH IRON
100 TABLET ORAL DAILY
COMMUNITY

## 2022-09-21 RX ORDER — UREA 10 %
100 LOTION (ML) TOPICAL DAILY
COMMUNITY

## 2022-09-21 NOTE — PROGRESS NOTES
PT Evaluation     Today's date: 2022  Patient name: Catrina Cabrera  : 1955  MRN: [id number]  Referring provider: YAMILA Herron*  Dx:   Encounter Diagnosis     ICD-10-CM    1  Rotator cuff tendinitis, left  M75 82 Ambulatory Referral to Physical Therapy                  Assessment  Assessment details: Catrina Cabrera is a 78 y o  female presenting to physical therapy for an initial evaluation with a MD referral of rotator cuff tendinitis, left  The patient demonstrates decreased left shoulder AROM, decreased strength, and increased pain with functional reaching and lifting activities  These deficits have limited the patient's ability to complete ADLs, vocational responsibilities, and recreational activities  This patient would benefit from OP PT services to address their impairments and functional limitations to maximize functional mobility  The patient was provided a HEP and demonstrated/verbalized understanding  Impairments: abnormal or restricted ROM, impaired physical strength, lacks appropriate home exercise program, pain with function and poor posture     Symptom irritability: moderateUnderstanding of Dx/Px/POC: excellent   Prognosis: good    Goals  STG to be achieved in 4 weeks: The patient will report a decrease in left shoulder "at worst" subjective pain rating score to at least 5/10 to allow for improved tolerance for functional activities  The patient will increase left shoulder flexion AROM to at least 135 degrees to allow for improved functional mobility  The patient will increase left shoulder flexion MMT score to at least 4/5 to allow for improved functional mobility  LTG to be achieved by DC: The patient will be independent in comprehensive HEP  The patient will report a decrease in left shoulder "at worst" subjective pain rating score to at least 3/10 to allow for improved tolerance  for functional mobility     The patient will improve left shoulder AROM to that of the right to allow for improved functional mobility  The patient will increase left shoulder MMT score to [facility] to allow for improved functional mobility  The patient will be able to complete functional lifting activities at work with minimal to no pain or difficulty  The patient will be able to don and doff her shirt without pain or difficulty  Plan  Patient would benefit from: skilled physical therapy  Planned modality interventions: thermotherapy: hydrocollator packs, TENS and cryotherapy  Planned therapy interventions: manual therapy, joint mobilization, neuromuscular re-education, patient education, flexibility, strengthening, stretching, therapeutic activities, therapeutic exercise, home exercise program and postural training  Frequency: 2x week  Duration in weeks: 10  Plan of Care beginning date: 9/21/2022  Plan of Care expiration date: 11/30/2022  Treatment plan discussed with: patient        Subjective Evaluation    History of Present Illness  Mechanism of injury: Geovanna Ragland presents to therapy with a chief complaint of left shoulder pain ongoing for approximately one year  She had a cortisone injection in the left shoulder 6 months ago and also a second one at her most recent follow up appointment with orthopedics on 9/13/22  Most recent imaging was an Xray performed on 2/22/22 which showed "Mild degenerative changes AC joint  No acute osseous abnormality " She reports that the both injections have helped in regards to pain relief, however she reports that she continues with increased pain with lifting tasks as well as pain at night  She states that she is unable to sleep onto her left side because of pain  She reports that she works at [employer] and has to lift heavy grocery bags for customers which she feels like the repetitive heavy lifting may have aggravated the shoulder again   She states that she as difficulty and pain with putting on/off her shirt, reaching behind her to a back seat of a car, mowing the lawn  Denies numbness/tingling, however states that when the pain is bad she can feel the pain radiating into her collar bone and down the right arm  Most often her pain is aching sensation in the anterior or lateral shoulder       Pain  Current pain ratin  At best pain ratin  At worst pain ratin  Location: L anterior/ lateral shoulder   Quality: dull ache and knife-like  Relieving factors: change in position and medications  Aggravating factors: overhead activity and lifting  Progression: no change    Social Support  Lives with: significant other    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[employer] )  Hand dominance: right      Diagnostic Tests  X-ray: normal  Treatments  Previous treatment: injection treatment and medication  Current treatment: physical therapy  Patient Goals  Patient goals for therapy: decreased pain, increased strength, return to work and independence with ADLs/IADLs  Patient goal: strengthen my arm to help with pain         Objective     Palpation   Left   No palpable tenderness to the biceps, infraspinatus, supraspinatus, teres major, teres minor and upper trapezius  Tenderness of the levator scapulae  Tenderness     Left Shoulder   Tenderness in the biceps tendon (proximal)  No tenderness in the [facility] joint, clavicle, infraspinatus tendon and supraspinatus tendon       Active Range of Motion   Left Shoulder   Flexion: 118 degrees with pain  Abduction: 120 degrees with pain  External rotation BTH: T3   Internal rotation BTB: T6     Right Shoulder   Flexion: 160 degrees   Abduction: 160 degrees   External rotation BTH: T3   Internal rotation BTB: T6     Passive Range of Motion   Left Shoulder   Flexion: 155 degrees with pain  Abduction: 155 degrees with pain  External rotation 45°: 65 degrees   Internal rotation 45°: 70 degrees     Strength/Myotome Testing     Left Shoulder     Planes of Motion   Flexion: 4-   Abduction: 4-   External rotation at 0°: 4- Internal rotation at 0°: 4     Right Shoulder     Planes of Motion   Flexion: 4+   Abduction: 4+   External rotation at 0°: 4+   Internal rotation at 0°: 4+     Tests     Left Shoulder   Positive empty can, external rotation lag sign, Hawkin's and Jessica/Laureano  Negative full can, Neer's and passive horizontal adduction                Precautions: standard  Access Code: [email or web address]       Manuals 9/21            IASTM to proximal biceps tendon                                                    Neuro Re-Ed             TB rows             TB pulldowns              Wall walks with TB             Wall clocks with TB             B/L shoulder ER with TB                                       Ther Ex             UBE fwd/retro             Wall slides flex, scapt HEP only            Corner pec stretch HEP            L UT/levator stretch HEP            TB ER             TB IR             Standing shoulder 3 ways                          Pt education PT POC, HEP, anatomy, physiology            Ther Activity                                       Gait Training                                       Modalities

## 2022-09-26 ENCOUNTER — APPOINTMENT (OUTPATIENT)
Dept: PHYSICAL THERAPY | Facility: CLINIC | Age: 67
End: 2022-09-26
Payer: MEDICARE

## 2022-09-27 ENCOUNTER — OFFICE VISIT (OUTPATIENT)
Dept: PHYSICAL THERAPY | Facility: CLINIC | Age: 67
End: 2022-09-27
Payer: MEDICARE

## 2022-09-27 DIAGNOSIS — M75.82 ROTATOR CUFF TENDINITIS, LEFT: Primary | ICD-10-CM

## 2022-09-27 PROCEDURE — 97112 NEUROMUSCULAR REEDUCATION: CPT

## 2022-09-27 PROCEDURE — 97110 THERAPEUTIC EXERCISES: CPT

## 2022-09-27 NOTE — PROGRESS NOTES
Daily Note     Today's date: 2022  Patient name: Sammy Armendariz  : 1955  MRN: [id number]  Referring provider: YAMILA Blair Che*  Dx:   Encounter Diagnosis     ICD-10-CM    1  Rotator cuff tendinitis, left  M75 82                   Subjective: Pt reported no changes since last treatment session  Objective: See treatment diary below      Assessment: Continued with treatment session, additional exercises noted this visit  Overall patient able to complete with proper form and technique along with scapular setting  Tolerated treatment fair  Patient exhibited good technique with therapeutic exercises and would benefit from continued PT  S/P treatment session, pt noted no significant changes in L shoulder  Plan: Continue per plan of care        Precautions: standard  Access Code: [email or web address]       Manuals            IASTM to proximal biceps tendon                                                    Neuro Re-Ed             TB rows  RTB 2x 10            TB pulldowns   RTB 2x 10            Wall walks with TB             Wall clocks with TB             B/L shoulder ER with TB                                       Ther Ex             UBE fwd/retro  3'/3' seated            Wall slides flex, scapt HEP only            Corner pec stretch HEP 30"x  3            L UT/levator stretch HEP 30" x 3            TB ER  RTB 2x 10            TB IR  RTB 2x 10            Standing shoulder 3 ways                          Pt education PT POC, HEP, anatomy, physiology DOMS            Ther Activity                                       Gait Training                                       Modalities

## 2022-09-29 ENCOUNTER — OFFICE VISIT (OUTPATIENT)
Dept: PHYSICAL THERAPY | Facility: CLINIC | Age: 67
End: 2022-09-29
Payer: MEDICARE

## 2022-09-29 DIAGNOSIS — M75.82 ROTATOR CUFF TENDINITIS, LEFT: Primary | ICD-10-CM

## 2022-09-29 PROCEDURE — 97112 NEUROMUSCULAR REEDUCATION: CPT

## 2022-09-29 PROCEDURE — 97110 THERAPEUTIC EXERCISES: CPT

## 2022-09-29 NOTE — PROGRESS NOTES
Daily Note     Today's date: 2022  Patient name: Brandie Zamarripa  : 1955  MRN: [id number]  Referring provider: YAMILA Crocker*  Dx:   Encounter Diagnosis     ICD-10-CM    1  Rotator cuff tendinitis, left  M75 82                   Subjective: Patient reports that her shoulder felt good after last treatment session  Denied soreness  Objective: See treatment diary below      Assessment: Tolerated treatment well  Progressed patient with increased reps and additional exercises as noted below to facilitate improved shoulder strength and scapular stability  Educated patient on wearing or bringing a short sleeved shirt to wear to allow for IASTM at next session  Patient demonstrated fatigue post treatment, exhibited good technique with therapeutic exercises and would benefit from continued PT      Plan: Continue per plan of care        Precautions: standard  Access Code: [email or web address]       Manuals           IASTM to L proximal biceps tendon   NV                                                 Neuro Re-Ed             TB rows  RTB 2x 10  RTB 3x10          TB pulldowns   RTB 2x 10  RTB 3x10          Wall walks with TB   RTB x3 laps           Wall clocks with TB   RTB x3 laps           B/L shoulder ER with TB   RTB   5" 2x10                                     Ther Ex             UBE fwd/retro  3'/3' seated  4'/4' seated          Wall slides flex, scapt HEP only            Corner pec stretch HEP 30"x  3  3x30"          L UT/levator stretch HEP 30" x 3  3x30"          TB ER  RTB 2x 10  RTB 3x10           TB IR  RTB 2x 10  RTB 3x10           Standing shoulder 3 ways                          Pt education PT POC, HEP, anatomy, physiology DOMS            Ther Activity                                       Gait Training                                       Modalities

## 2022-10-03 ENCOUNTER — APPOINTMENT (OUTPATIENT)
Dept: PHYSICAL THERAPY | Facility: CLINIC | Age: 67
End: 2022-10-03

## 2022-10-04 ENCOUNTER — OFFICE VISIT (OUTPATIENT)
Dept: PHYSICAL THERAPY | Facility: CLINIC | Age: 67
End: 2022-10-04
Payer: MEDICARE

## 2022-10-04 DIAGNOSIS — M75.82 ROTATOR CUFF TENDINITIS, LEFT: Primary | ICD-10-CM

## 2022-10-04 PROCEDURE — 97110 THERAPEUTIC EXERCISES: CPT

## 2022-10-04 PROCEDURE — 97140 MANUAL THERAPY 1/> REGIONS: CPT

## 2022-10-04 PROCEDURE — 97112 NEUROMUSCULAR REEDUCATION: CPT

## 2022-10-04 NOTE — PROGRESS NOTES
Daily Note     Today's date: 10/4/2022  Patient name: Berna Esquivel  : 1955  MRN: [id number]  Referring provider: YAMILA Ramos*  Dx:   Encounter Diagnosis     ICD-10-CM    1  Rotator cuff tendinitis, left  M75 82        Start Time: 1400  Stop Time: 1450  Total time in clinic (min): 50 minutes    Subjective: Pt noted that she has been feeling okay today but has been having ache in her L shoulder and bicep  Objective: See treatment diary below      Assessment: Continued with treatment session, minimal progressions added this visit secondary to L shoulder soreness s/p last treatment session  Tolerated treatment fair  Patient demonstrated fatigue post treatment, exhibited good technique with therapeutic exercises and would benefit from continued PT  S/p treatment session, Pt noted no changes  IASTM added to proximal biceps tendon to increase blood flow and circulation to the area to promote healing  Advised patient on benefits along with side effects that may present themselves such as bruising  Pt noted no changes and does have some       DOMS reviewed and acknowledged by patient may have 24 to 48 hours of muscle soreness following treatment session secondary to changes  Pt also educated she may have some soreness in L proximal bicep tendon          HEP:    Pt compliant with HEP  And understanding the importance of completion at home        Plan: Continue per plan of care        Precautions: standard  Access Code: [email or web address]       Manuals 9/21 9/27 9/29 10/4         IASTM to L proximal biceps tendon   NV SC                                                Neuro Re-Ed             TB rows  RTB 2x 10  RTB 3x10 RTB 3x 10          TB pulldowns   RTB 2x 10  RTB 3x10 RTB 3x 10          Wall walks with TB   RTB x3 laps  RTB 3x  laps         Wall clocks with TB   RTB x3 laps  RTB 3x laps          B/L shoulder ER with TB   RTB   5" 2x10  RTB 5" 2x 10                                    Ther Ex             UBE fwd/retro  3'/3' seated  4'/4' seated 4'/4' seated            Wall slides flex, scapt HEP only            Corner pec stretch HEP 30"x  3  3x30" 3x 30"          L UT/levator stretch HEP 30" x 3  3x30"          TB ER  RTB 2x 10  RTB 3x10  RTB3x 10          TB IR  RTB 2x 10  RTB 3x10  RTB 3x 10          Standing shoulder 3 ways                          Pt education PT POC, HEP, anatomy, physiology DOMS   DOMS          Ther Activity                                       Gait Training                                       Modalities

## 2022-10-06 ENCOUNTER — OFFICE VISIT (OUTPATIENT)
Dept: PHYSICAL THERAPY | Facility: CLINIC | Age: 67
End: 2022-10-06
Payer: MEDICARE

## 2022-10-06 DIAGNOSIS — M75.82 ROTATOR CUFF TENDINITIS, LEFT: Primary | ICD-10-CM

## 2022-10-06 PROCEDURE — 97112 NEUROMUSCULAR REEDUCATION: CPT

## 2022-10-06 PROCEDURE — 97140 MANUAL THERAPY 1/> REGIONS: CPT

## 2022-10-06 PROCEDURE — 97110 THERAPEUTIC EXERCISES: CPT

## 2022-10-06 NOTE — PROGRESS NOTES
Daily Note     Today's date: 10/6/2022  Patient name: Clint Epstein  : 1955  MRN: [id number]  Referring provider: YAMILA Pérez*  Dx:   Encounter Diagnosis     ICD-10-CM    1  Rotator cuff tendinitis, left  M75 82                   Subjective: Patient reports that her shoulder feels sore after last session  She isn't sure if it is from the exercises or the IASTM  Objective: See treatment diary below      Assessment: Tolerated treatment well  No progressions this session secondary to patient noting continued soreness after last session  Patient cued throughout session on proper form and technique as this might be the cause of her shoulder discomfort post therapy sessions  Decreased pressure with IASTM with patient reporting improved symptoms in her shoulder at end of session  Discussed proper lifting mechanics to consider when returning to work as a  at [employer]  Patient demonstrated fatigue post treatment, exhibited good technique with therapeutic exercises and would benefit from continued PT      Plan: Progress treatment as tolerated  Precautions: standard  Access Code: [email or web address]       Manuals 9/21 9/27 9/29 10/4 10/6        IASTM to L proximal biceps tendon   NV SC BE                                               Neuro Re-Ed             TB rows  RTB 2x 10  RTB 3x10 RTB 3x 10  RTB 3x 10         TB pulldowns   RTB 2x 10  RTB 3x10 RTB 3x 10  RTB 3x 10         Wall walks with TB   RTB x3 laps  RTB 3x  laps RTB x3 laps        Wall clocks with TB   RTB x3 laps  RTB 3x laps  RTB x3 laps         B/L shoulder ER with TB   RTB   5" 2x10  RTB 5" 2x 10  RTB 5" 2x 10                                   Ther Ex             UBE fwd/retro  3'/3' seated  4'/4' seated 4'/4' seated    4'/4'        Wall slides flex, scapt HEP only            Corner pec stretch HEP 30"x  3  3x30" 3x 30"          L UT/levator stretch HEP 30" x 3  3x30"          TB ER  RTB 2x 10  RTB 3x10  RTB3x 10  RTB 3x10  VCs        TB IR  RTB 2x 10  RTB 3x10  RTB 3x 10  RTB 3x 10         Standing shoulder 3 ways                          Pt education PT POC, HEP, anatomy, physiology DOMS   DOMS  return to work lifting         Ther Activity                                       Gait Training                                       Modalities

## 2022-10-12 ENCOUNTER — OFFICE VISIT (OUTPATIENT)
Dept: PHYSICAL THERAPY | Facility: CLINIC | Age: 67
End: 2022-10-12
Payer: MEDICARE

## 2022-10-12 DIAGNOSIS — M75.82 ROTATOR CUFF TENDINITIS, LEFT: Primary | ICD-10-CM

## 2022-10-12 PROCEDURE — 97110 THERAPEUTIC EXERCISES: CPT | Performed by: PHYSICAL THERAPIST

## 2022-10-12 PROCEDURE — 97140 MANUAL THERAPY 1/> REGIONS: CPT | Performed by: PHYSICAL THERAPIST

## 2022-10-12 NOTE — PROGRESS NOTES
Daily Note     Today's date: 10/12/2022  Patient name: Samantha Chua  : 1955  MRN: [id number]  Referring provider: YAMILA Case*  Dx:   Encounter Diagnosis     ICD-10-CM    1  Rotator cuff tendinitis, left  M75 82        Start Time: 1215  Stop Time: 1300  Total time in clinic (min): 45 minutes    Subjective: Patient reports that she hurts more today  Objective: See treatment diary below      Assessment: Tolerated treatment well  Patient demonstrated fatigue post treatment and would benefit from continued PT  No significant changes to overall status this visit  Plan: Continue per plan of care  Progress treatment as tolerated  Precautions: standard  Access Code: [email or web address]       Manuals 9/21 9/27 9/29 10/4 10/6 10/12       IASTM to L proximal biceps tendon   NV SC BE GR                                              Neuro Re-Ed             TB rows  RTB 2x 10  RTB 3x10 RTB 3x 10  RTB 3x 10  RTB 3x10       TB pulldowns   RTB 2x 10  RTB 3x10 RTB 3x 10  RTB 3x 10  RTB 3x10       Wall walks with TB   RTB x3 laps  RTB 3x  laps RTB x3 laps        Wall clocks with TB   RTB x3 laps  RTB 3x laps  RTB x3 laps         B/L shoulder ER with TB   RTB   5" 2x10  RTB 5" 2x 10  RTB 5" 2x 10                                   Ther Ex             UBE fwd/retro  3'/3' seated  4'/4' seated 4'/4' seated  4'/4' 4' / 4'       Wall slides flex, scapt HEP only            Corner pec stretch HEP 30"x  3  3x30" 3x 30"          L UT/levator stretch HEP 30" x 3  3x30"          TB ER  RTB 2x 10  RTB 3x10  RTB3x 10  RTB 3x10  VCs RTB 3x10        TB IR  RTB 2x 10  RTB 3x10  RTB 3x 10  RTB 3x 10  RTB 3x10       Standing shoulder 3 ways      RTB 2x10 2# ea                      Pt education PT POC, HEP, anatomy, physiology DOMS   DOMS  return to work lifting         Ther Activity                                       Gait Training                                       Modalities

## 2022-10-14 ENCOUNTER — OFFICE VISIT (OUTPATIENT)
Dept: PHYSICAL THERAPY | Facility: CLINIC | Age: 67
End: 2022-10-14
Payer: MEDICARE

## 2022-10-14 DIAGNOSIS — M75.82 ROTATOR CUFF TENDINITIS, LEFT: Primary | ICD-10-CM

## 2022-10-14 PROCEDURE — 97110 THERAPEUTIC EXERCISES: CPT

## 2022-10-14 PROCEDURE — 97112 NEUROMUSCULAR REEDUCATION: CPT

## 2022-10-14 NOTE — PROGRESS NOTES
Daily Note     Today's date: 10/14/2022  Patient name: Kristen Hollins  : 1955  MRN: [id number]  Referring provider: YAMILA Monge*  Dx:   Encounter Diagnosis     ICD-10-CM    1  Rotator cuff tendinitis, left  M75 82        Start Time: 1100  Stop Time: 1138  Total time in clinic (min): 38 minutes    Subjective: Pt  Noted that she has the same amount of pain not changes  Pt noted she is going to work right after therapy today  Objective: See treatment diary below      Assessment: Continued with treatment session, overall patient able to complete all the exercises  Tolerated treatment fair  Patient exhibited good technique with therapeutic exercises and would benefit from continued PT  S/p treatment session, Pt noted that she has been feeling good and has no changes in pain status  Plan: Continue per plan of care  Precautions: standard  Access Code: [email or web address]       Manuals 9/21 9/27 9/29 10/4 10/6 10/12 10/14      IASTM to L proximal biceps tendon   NV SC BE GR NV                                             Neuro Re-Ed             TB rows  RTB 2x 10  RTB 3x10 RTB 3x 10  RTB 3x 10  RTB 3x10 GTB 3x 10       TB pulldowns   RTB 2x 10  RTB 3x10 RTB 3x 10  RTB 3x 10  RTB 3x10 GTB 3x 10       Wall walks with TB   RTB x3 laps  RTB 3x  laps RTB x3 laps        Wall clocks with TB   RTB x3 laps  RTB 3x laps  RTB x3 laps         B/L shoulder ER with TB   RTB   5" 2x10  RTB 5" 2x 10  RTB 5" 2x 10                                   Ther Ex             UBE fwd/retro  3'/3' seated  4'/4' seated 4'/4' seated  4'/4' 4' / 4' 4'/4'      Wall slides flex, scapt HEP only            Corner pec stretch HEP 30"x  3  3x30" 3x 30"          L UT/levator stretch HEP 30" x 3  3x30"          TB ER  RTB 2x 10  RTB 3x10  RTB3x 10  RTB 3x10  VCs RTB 3x10  RTB 3x 10       TB IR  RTB 2x 10  RTB 3x10  RTB 3x 10  RTB 3x 10  RTB 3x10 RTB 3x 10       Standing shoulder 3 ways      RTB 2x10 2# ea  RTB 2x 10 2# ea  Pt education PT POC, HEP, anatomy, physiology DOMS   DOMS  return to work lifting         Ther Activity                                       Gait Training                                       Modalities

## 2022-10-18 ENCOUNTER — APPOINTMENT (OUTPATIENT)
Dept: PHYSICAL THERAPY | Facility: CLINIC | Age: 67
End: 2022-10-18

## 2022-10-21 ENCOUNTER — EVALUATION (OUTPATIENT)
Dept: PHYSICAL THERAPY | Facility: CLINIC | Age: 67
End: 2022-10-21
Payer: MEDICARE

## 2022-10-21 DIAGNOSIS — M75.82 ROTATOR CUFF TENDINITIS, LEFT: Primary | ICD-10-CM

## 2022-10-21 PROCEDURE — 97110 THERAPEUTIC EXERCISES: CPT

## 2022-10-21 NOTE — PROGRESS NOTES
PT Discharge    Today's date: 10/21/2022  Patient name: Catrina Cabrera  : 1955  MRN: [id number]  Referring provider: YAMILA Herron*  Dx:   Encounter Diagnosis     ICD-10-CM    1  Rotator cuff tendinitis, left  M75 82                   Assessment  Assessment details: Catrina Cabrera is a 78 y o  female presenting to physical therapy for a reevaluation with a MD referral of rotator cuff tendinitis, left  Since her initial evaluation, she reports minimal to no improvement in her shoulder  The patient demonstrated a decrease in her shoulder abduction ROM and remains limited with shoulder flexion AROM  Her passive ROM is WFL, however is painful  She continues with decreased shoulder strength and elevated pain levels following overhead or away from the body shoulder movements as well as reports of worsening pain following therapy sessions  Secondary to continued pain and worsened symptoms with therapy, the patient would like to be DC from PT services at this time  She was advised to contact orthopedic office as she may benefit from additional imaging at this time  She verbalized understanding and denied questions at end of session  Impairments: abnormal or restricted ROM, impaired physical strength and pain with function    Symptom irritability: moderateUnderstanding of Dx/Px/POC: excellent   Prognosis: good    Goals  STG to be achieved in 4 weeks: The patient will report a decrease in left shoulder "at worst" subjective pain rating score to at least 5/10 to allow for improved tolerance for functional activities  NOT MET  The patient will increase left shoulder flexion AROM to at least 135 degrees to allow for improved functional mobility  NOT MET  The patient will increase left shoulder flexion MMT score to at least 4/5 to allow for improved functional mobility  MET    LTG to be achieved by DC: The patient will be independent in comprehensive HEP   MET  The patient will report a decrease in left shoulder "at worst" subjective pain rating score to at least 3/10 to allow for improved tolerance  for functional mobility  NOT MET  The patient will improve left shoulder AROM to that of the right to allow for improved functional mobility  NOT MET  The patient will increase left shoulder MMT score to [facility] to allow for improved functional mobility  NOT MET  The patient will be able to complete functional lifting activities at work with minimal to no pain or difficulty  NOT MET  The patient will be able to don and doff her shirt without pain or difficulty  NOT MET    Plan  Plan of Care beginning date: 10/21/2022  Plan of Care expiration date: 10/21/2022  Treatment plan discussed with: patient        Subjective Evaluation    History of Present Illness  Mechanism of injury: Percy Eric reports minimal to no improvement since beginning PT services  She reports that she has continued elevated pain with moving the arm over head and out to the side  She reports that she still has pain with lifting household objects and is unable to complete lifting required at work with her left arm  She reports that she still uses her right arm for most tasks because of this  She states that the pain is still in the lateral aspect of the arm and it stops her for sleeping at night  She states that if anything, her shoulder feels worse after her sessions that evening or the next morning     Pain  Current pain ratin  At best pain rating: 3  At worst pain ratin  Location: L lateral shoulder   Quality: dull ache and knife-like  Relieving factors: change in position and medications  Aggravating factors: overhead activity and lifting  Progression: no change    Social Support  Lives with: significant other    Employment status: working ( [employer], Sales at [employer] )  Hand dominance: right      Diagnostic Tests  X-ray: normal  Treatments  Previous treatment: injection treatment and medication  Current treatment: physical therapy  Patient Goals  Patient goals for therapy: decreased pain, increased strength, return to work and independence with ADLs/IADLs  Patient goal: strengthen my arm to help with pain         Objective     Palpation   Left   No palpable tenderness to the biceps, infraspinatus, levator scapulae, supraspinatus, teres major, teres minor and upper trapezius  Tenderness     Left Shoulder   Tenderness in the biceps tendon (proximal)  No tenderness in the [facility] joint, clavicle, infraspinatus tendon and supraspinatus tendon  Active Range of Motion   Left Shoulder   Flexion: 120 degrees with pain  Abduction: 110 degrees with pain  External rotation BTH: T3   Internal rotation BTB: T6 with pain    Right Shoulder   Flexion: 160 degrees   Abduction: 160 degrees   External rotation BTH: T3   Internal rotation BTB: T6     Passive Range of Motion   Left Shoulder   Flexion: 155 degrees with pain  Abduction: 155 degrees with pain  External rotation 45°: 74 degrees   Internal rotation 45°: 70 degrees     Strength/Myotome Testing     Left Shoulder     Planes of Motion   Flexion: 4   Abduction: 4-   External rotation at 0°: 4-   Internal rotation at 0°: 4+     Right Shoulder     Planes of Motion   Flexion: 4+   Abduction: 4+   External rotation at 0°: 4+   Internal rotation at 0°: 4+     Tests     Left Shoulder   Positive empty can, external rotation lag sign, Hawkin's and Jessica/Laureano  Negative full can, Neer's and passive horizontal adduction                Precautions: standard  Access Code: [email or web address]     Manuals 9/21 9/27 9/29 10/4 10/6 10/12 10/14 10/21     IASTM to L proximal biceps tendon   NV SC BE GR NV                                             Neuro Re-Ed             TB rows  RTB 2x 10  RTB 3x10 RTB 3x 10  RTB 3x 10  RTB 3x10 GTB 3x 10       TB pulldowns   RTB 2x 10  RTB 3x10 RTB 3x 10  RTB 3x 10  RTB 3x10 GTB 3x 10       Wall walks with TB   RTB x3 laps  RTB 3x  laps RTB x3 laps        Wall clocks with TB   RTB x3 laps  RTB 3x laps RTB x3 laps         B/L shoulder ER with TB   RTB   5" 2x10  RTB 5" 2x 10  RTB 5" 2x 10                                   Ther Ex             UBE fwd/retro  3'/3' seated  4'/4' seated 4'/4' seated  4'/4' 4' / 4' 4'/4'      Wall slides flex, scapt HEP only            Corner pec stretch HEP 30"x  3  3x30" 3x 30"          L UT/levator stretch HEP 30" x 3  3x30"          TB ER  RTB 2x 10  RTB 3x10  RTB3x 10  RTB 3x10  VCs RTB 3x10  RTB 3x 10       TB IR  RTB 2x 10  RTB 3x10  RTB 3x 10  RTB 3x 10  RTB 3x10 RTB 3x 10       Standing shoulder 3 ways      RTB 2x10 2# ea  RTB 2x 10 2# ea                      Pt education PT POC, HEP, anatomy, physiology DOMS   DOMS  return to work lifting    reassessment, PT POC, HEP      Ther Activity                                       Gait Training                                       Modalities

## 2022-11-01 ENCOUNTER — OFFICE VISIT (OUTPATIENT)
Dept: OBGYN CLINIC | Facility: CLINIC | Age: 67
End: 2022-11-01

## 2022-11-01 VITALS
DIASTOLIC BLOOD PRESSURE: 79 MMHG | HEART RATE: 77 BPM | HEIGHT: 65 IN | BODY MASS INDEX: 24.66 KG/M2 | WEIGHT: 148 LBS | SYSTOLIC BLOOD PRESSURE: 117 MMHG

## 2022-11-01 DIAGNOSIS — M25.512 LEFT SHOULDER PAIN, UNSPECIFIED CHRONICITY: ICD-10-CM

## 2022-11-01 DIAGNOSIS — M75.82 ROTATOR CUFF TENDINITIS, LEFT: Primary | ICD-10-CM

## 2022-11-01 DIAGNOSIS — M24.812 INTERNAL DERANGEMENT OF LEFT SHOULDER: ICD-10-CM

## 2022-11-01 NOTE — PROGRESS NOTES
Orthopaedics Office Visit - Established Patient Visit    ASSESSMENT/PLAN:    Assessment:   Suspected rotator cuff tear of the left shoulder  Pain refractory to PT, oral medications, rest, two injections    Plan:   · The patient has failed conservative measures including physical therapy and injections  · MRI was ordered to rule out rotator cuff tear  · The patient was referred to Dr Khris Morris following her MRI to discuss the results and possibly discuss surgical intervention      To Do Next Visit:  Referred to Dr Khris Morris     _____________________________________________________  CHIEF COMPLAINT:  Chief Complaint   Patient presents with   • Left Shoulder - Follow-up         SUBJECTIVE:  Preston Sherwood is a 78 y o  female who presents for follow-up of left rotator cuff tendinitis  The patient was last seen in the office on 9/13/22 where conservative and surgical treatment were discussed, a corticosteroid injection was provided, she was referred to physical therapy  This is the second injection she received and the 1st one povdied great relief for her  She was released from physical therapy because it did not help and made her symptoms worse  The injection worked up until she began physical therapy  Pain is managed with Tylenol  She utilizes Tumeric for antiinflammatory purposes  She has great discomfort with sleeping and has difficulty sleeping  She has not history of shoulder surgery  She has a history of cervical dis disease and complains of burning at the right rhomboid  She denies any numbness or tingling into her hands or fingers         PAST MEDICAL HISTORY:  Past Medical History:   Diagnosis Date   • GERD (gastroesophageal reflux disease)    • Hyperlipidemia    • Insomnia        PAST SURGICAL HISTORY:  Past Surgical History:   Procedure Laterality Date   • BREAST LUMPECTOMY      lumpectomy, resolved 1999   • COLONOSCOPY      resolved 2008   • [place] ESOPHAGOGASTRODUODENOSCOPY TRANSORAL DIAGNOSTIC N/A 6/8/2017 Procedure: EGD AND COLONOSCOPY;  Surgeon: Lan Ojeda MD;  Location: [facility]; Service: Gastroenterology       FAMILY HISTORY:  Family History   Problem Relation Age of Onset   • Parkinsonism Mother    • Diabetes Father         DM   • Cancer Father         urinary bladder   • Hypertension Father        SOCIAL HISTORY:  Social History     Tobacco Use   • Smoking status: Never Smoker   • Smokeless tobacco: Never Used   Vaping Use   • Vaping Use: Never used   Substance Use Topics   • Alcohol use: Yes     Alcohol/week: 1 0 standard drink     Types: 1 Glasses of wine per week     Comment: one glass a day   • Drug use: No       MEDICATIONS:    Current Outpatient Medications:   •  Calcium Carb-Cholecalciferol (OSCAL-D) 500 mg-200 units per tablet, Take 1 tablet by mouth 2 (two) times a day with meals, Disp: , Rfl:   •  ibuprofen (MOTRIN) 200 mg tablet, Take by mouth every 6 (six) hours as needed for mild pain, Disp: , Rfl:   •  pyridoxine (VITAMIN B6) 100 mg tablet, Take 100 mg by mouth daily, Disp: , Rfl:   •  Turmeric 500 MG CAPS, Take by mouth, Disp: , Rfl:   •  vitamin B-12 (CYANOCOBALAMIN) 100 MCG tablet, Take 100 mcg by mouth daily, Disp: , Rfl:   •  neomycin-polymyxin-dexamethasone (MAXITROL) ophthalmic suspension, INSTILL 1 DROP INTO RIGHT EYE 4 TIMES A DAY (Patient not taking: No sig reported), Disp: , Rfl:     ALLERGIES:  Allergies   Allergen Reactions   • Penicillins Rash     Pt  Not sure what allergy is  REVIEW OF SYSTEMS:  MSK: left shoulder pain  Neuro: WNL  Pertinent items are otherwise noted in HPI  A comprehensive review of systems was otherwise negative      LABS:  HgA1c: No results found for: HGBA1C  BMP:   Lab Results   Component Value Date    CALCIUM 9 2 03/17/2022    K 3 8 03/17/2022    CO2 30 03/17/2022     03/17/2022    BUN 14 03/17/2022    CREATININE 0 85 03/17/2022     CBC: No components found for: CBC    _____________________________________________________  PHYSICAL EXAMINATION:  Vital signs: /79   Pulse 77   Ht 5' 5" (1 651 m)   Wt 67 1 kg (148 lb)   BMI 24 63 kg/m²   General: No acute distress, awake and alert  Psychiatric: Mood and affect appear appropriate  HEENT: Trachea Midline, No torticollis, no apparent facial trauma  Cardiovascular: No audible murmurs; Extremities appear perfused  Pulmonary: No audible wheezing or stridor  Skin: No open lesions; see further details (if any) below    MUSCULOSKELETAL EXAMINATION:  Extremities:      Left Shoulder: Active range of motion   100 degrees forward flexion  150 degrees abduction  60 degrees external rotation   equal internal rotation    There is 4/5 strength with external rotation testing at the side  Empty can testing is positive   Speed's test is Positive  The patient is neurovascularly intact distally in the extremity  _____________________________________________________  STUDIES REVIEWED:  I personally reviewed the images and interpretation is as follows: No new images       PROCEDURES PERFORMED:  Procedures  None performed         Scribe Attestation    I,:  Marcus Amador am acting as a scribe while in the presence of the attending physician :       I,:  Shalom Toledo MD personally performed the services described in this documentation    as scribed in my presence :

## 2022-11-17 ENCOUNTER — HOSPITAL ENCOUNTER (OUTPATIENT)
Dept: MRI IMAGING | Facility: CLINIC | Age: 67
Discharge: HOME | End: 2022-11-17

## 2022-11-17 DIAGNOSIS — M24.812 INTERNAL DERANGEMENT OF LEFT SHOULDER: ICD-10-CM

## 2022-11-17 DIAGNOSIS — M75.82 ROTATOR CUFF TENDINITIS, LEFT: ICD-10-CM

## 2022-11-17 DIAGNOSIS — M25.512 LEFT SHOULDER PAIN, UNSPECIFIED CHRONICITY: ICD-10-CM

## 2022-11-21 NOTE — PROGRESS NOTES
Patient Name:  Emma March  MRN:  [id number]    [address] [place] [address]     1  Nerve root irritation  -     methylPREDNISolone 4 MG tablet therapy pack; Use as directed on package    2  Incomplete tear of left rotator cuff, unspecified whether traumatic  -     Ambulatory Referral to Orthopedic Surgery  -     methylPREDNISolone 4 MG tablet therapy pack; Use as directed on package    3  Left shoulder pain, unspecified chronicity  -     Ambulatory Referral to Orthopedic Surgery    4  Internal derangement of left shoulder  -     Ambulatory Referral to Orthopedic Surgery        78 y o  female with Left shoulder partial thickness supraspinatus tear  X-rays reviewed in office today with patient  In depth conversation had with patient regarding nonoperative vs surgical management  Nonoperative treatment including outpatient PT to work on range of motion with improvement of range of motion, postural strengthening, OTC oral analgesics, injection therapy  Can consider oral steroids to decrease inflammation which can be of shoulder vs cervical spine etiology with her nerve irritation symptoms  Surgical management consisting of Left shoulder arthroscopic rotator cuff repair, all associated procedures  At this time, would move forward with continued outpatient PT and medrol dose pack to decrease pain  If patients pain persists or worsens with persistent nonoperative treatment, would consider moving forward with surgical intervention  If patients neck/nerve root irritation symptoms would persist, can consider x-rays of cervical spine  Advised patient to discontinue NSAIDs while administering oral steroids  Verbalized understanding of the above and will follow up in 6-8 weeks for reevaluation of Left shoulder  Chief Complaint     Left shoulder pain    History of the Present Illness     Emma March is a RHD 78 y o  female with Left shoulder pain ongoing for a year without discrete injury   She thinks it is from repetitive work at [employer]  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; 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 people  She did seek out medical care with Dr Lorraine Contreras where she received 2 corticosteroid injections which provided her with moderate pain relief, until she started with therapy  She reports therapy made her pain worse and was discharged after a month  Patient admits to moderate pain in the shoulder with overhead activities and sleeping Right side lying  Patient does admit to radiation of pain past her elbow and a "hot poker" sensation in her mid back  She denies recent administration of oral steroids, neck pain, numbness or tingling in the Left upper extremity  Review of Systems     Review of Systems   Constitutional: Negative for chills and fever  HENT: Negative for congestion  Respiratory: Negative for cough, chest tightness and shortness of breath  Cardiovascular: Negative for chest pain and palpitations  Gastrointestinal: Negative for abdominal pain  Endocrine: Negative for cold intolerance and heat intolerance  Neurological: Negative for syncope  Psychiatric/Behavioral: Negative for confusion  Physical Exam     /86   Pulse 99   Ht 5' 5" (1 651 m)   Wt 67 1 kg (148 lb)   BMI 24 63 kg/m²     Left Shoulder: Active range of motion   140 degrees forward flexion  140 degrees abduction  70 degrees external rotation   Mid thoracic internal rotation    Passive range of motion   170 degrees of forward flexion     There is tenderness present over the upper trapezius, pec minor, proximal biceps tendon, greater tuberosity of humerus  There is 4/5 strength with external rotation testing at the side  Empty can testing is positive with weakness  Belly press test with noted weakness  Bear hug negative  Serrano test is positive  Miller's test is positive   Speed's test is Positive  The patient is neurovascularly intact distally in the extremity  Cervical Spine:    There is bilaterally paraspinal hypertonicity and tenderness  Sensation intact to light touch C5 through T1 dermatomes left upper extremity  Sensation intact to light touch C5 through T1 dermatomes right upper extremity  Left Motor: 5/5 biceps, 5/5 triceps, 5/5 wrist extension, 5/5 finger flexion, 5/5 finger abduction   Right Motor: 5/5 biceps, 5/5 triceps, 5/5 wrist extension, 5/5 finger flexion, 5/5 finger abduction       Eyes:  Anicteric sclerae  Neck:  Supple  Lungs:  Normal respiratory effort  Cardiovascular:  Capillary refill is less than 2 seconds  Skin:  Intact without erythema  Neurologic:  Sensation grossly intact to light touch  Psychiatric:  Mood and affect are appropriate  Data Review     I have personally reviewed pertinent films in PACS, and my interpretation follows:    X-rays taken 02/22/2022 of Left shoulder demonstrates no acute fracture or dislocation  No significant degenerative changes noted  MRI performed 11/17/2022 of Left shoulder demonstrates partial thickness tear of supraspinatus without evidence of full thickness tearing  No acute fracture or dislocation  Past Medical History:   Diagnosis Date   • GERD (gastroesophageal reflux disease)    • Hyperlipidemia    • Insomnia        Past Surgical History:   Procedure Laterality Date   • BREAST LUMPECTOMY      lumpectomy, resolved 1999   • COLONOSCOPY      resolved 2008   • [place] ESOPHAGOGASTRODUODENOSCOPY TRANSORAL DIAGNOSTIC N/A 6/8/2017    Procedure: EGD AND COLONOSCOPY;  Surgeon: Nir Schroeder MD;  Location: [facility]; Service: Gastroenterology       Allergies   Allergen Reactions   • Penicillins Rash     Pt  Not sure what allergy is         Current Outpatient Medications on File Prior to Visit   Medication Sig Dispense Refill   • Calcium Carb-Cholecalciferol (OSCAL-D) 500 mg-200 units per tablet Take 1 tablet by mouth 2 (two) times a day with meals     • Naproxen Sodium (ALEVE PO) Take by mouth     • pyridoxine (VITAMIN B6) 100 mg tablet Take 100 mg by mouth daily     • Turmeric 500 MG CAPS Take by mouth     • vitamin B-12 (CYANOCOBALAMIN) 100 MCG tablet Take 100 mcg by mouth daily     • ibuprofen (MOTRIN) 200 mg tablet Take by mouth every 6 (six) hours as needed for mild pain (Patient not taking: Reported on 11/22/2022)     • [DISCONTINUED] neomycin-polymyxin-dexamethasone (MAXITROL) ophthalmic suspension INSTILL 1 DROP INTO RIGHT EYE 4 TIMES A DAY (Patient not taking: No sig reported)       No current facility-administered medications on file prior to visit  Social History     Tobacco Use   • Smoking status: Never   • Smokeless tobacco: Never   Vaping Use   • Vaping Use: Never used   Substance Use Topics   • Alcohol use:  Yes     Alcohol/week: 1 0 standard drink     Types: 1 Glasses of wine per week     Comment: one glass a day   • Drug use: No       Family History   Problem Relation Age of Onset   • Parkinsonism Mother    • Diabetes Father         DM   • Cancer Father         urinary bladder   • Hypertension Father              Procedures Performed     Procedures  None       Robert Jackson DO

## 2022-11-22 ENCOUNTER — OFFICE VISIT (OUTPATIENT)
Dept: OBGYN CLINIC | Facility: CLINIC | Age: 67
End: 2022-11-22

## 2022-11-22 VITALS
SYSTOLIC BLOOD PRESSURE: 131 MMHG | WEIGHT: 148 LBS | HEIGHT: 65 IN | BODY MASS INDEX: 24.66 KG/M2 | HEART RATE: 99 BPM | DIASTOLIC BLOOD PRESSURE: 86 MMHG

## 2022-11-22 DIAGNOSIS — M75.112 INCOMPLETE TEAR OF LEFT ROTATOR CUFF, UNSPECIFIED WHETHER TRAUMATIC: ICD-10-CM

## 2022-11-22 DIAGNOSIS — M24.812 INTERNAL DERANGEMENT OF LEFT SHOULDER: ICD-10-CM

## 2022-11-22 DIAGNOSIS — G54.9 NERVE ROOT IRRITATION: Primary | ICD-10-CM

## 2022-11-22 DIAGNOSIS — M25.512 LEFT SHOULDER PAIN, UNSPECIFIED CHRONICITY: ICD-10-CM

## 2022-11-22 RX ORDER — METHYLPREDNISOLONE 4 MG/1
TABLET ORAL
Qty: 1 EACH | Refills: 0 | Status: SHIPPED | OUTPATIENT
Start: 2022-11-22

## 2023-06-22 ENCOUNTER — OFFICE VISIT (OUTPATIENT)
Dept: FAMILY MEDICINE CLINIC | Facility: MEDICAL CENTER | Age: 68
End: 2023-06-22
Payer: COMMERCIAL

## 2023-06-22 VITALS
SYSTOLIC BLOOD PRESSURE: 118 MMHG | BODY MASS INDEX: 25.36 KG/M2 | OXYGEN SATURATION: 98 % | WEIGHT: 152.2 LBS | HEIGHT: 65 IN | DIASTOLIC BLOOD PRESSURE: 76 MMHG | HEART RATE: 88 BPM

## 2023-06-22 DIAGNOSIS — E78.5 HYPERLIPIDEMIA, UNSPECIFIED HYPERLIPIDEMIA TYPE: ICD-10-CM

## 2023-06-22 DIAGNOSIS — Z23 IMMUNIZATION DUE: ICD-10-CM

## 2023-06-22 DIAGNOSIS — Z76.89 ENCOUNTER TO ESTABLISH CARE WITH NEW DOCTOR: Primary | ICD-10-CM

## 2023-06-22 DIAGNOSIS — Z11.59 NEED FOR HEPATITIS C SCREENING TEST: ICD-10-CM

## 2023-06-22 DIAGNOSIS — Z13.0 SCREENING FOR DISORDER OF BLOOD AND BLOOD-FORMING ORGANS: ICD-10-CM

## 2023-06-22 DIAGNOSIS — Z13.29 THYROID DISORDER SCREEN: ICD-10-CM

## 2023-06-22 PROCEDURE — 90677 PCV20 VACCINE IM: CPT

## 2023-06-22 PROCEDURE — 99214 OFFICE O/P EST MOD 30 MIN: CPT

## 2023-06-22 PROCEDURE — G0009 ADMIN PNEUMOCOCCAL VACCINE: HCPCS

## 2023-06-22 NOTE — PROGRESS NOTES
Assessment/Plan:    Fasting blood work ordered, to be done earliest convenience  PCV 20 vaccine updated today  Mammogram up-to-date  DEXA scan up-to-date  Colonoscopy up-to-date  Pap up-to-date  Return in 3 months for AWV, sooner if needed  1  Encounter to establish care with new doctor  71-year-old female presents to establish care with new provider  She is a previous patient at this practice, was seeing Dr Mirlande Chan  She has a past medical history of hyperlipidemia, GERD, insomnia, osteopenia  She is currently only taking over-the-counter supplements  She is agreeable to fasting blood work and returning for [address]  2  Immunization due  PCV 20 vaccine updated today, tolerated well  - Pneumococcal Conjugate Vaccine 20-valent (Pcv20)    3  Need for hepatitis C screening test  Patient agreeable  - Hepatitis C Antibody; Future    4  Hyperlipidemia, unspecified hyperlipidemia type  History of hyperlipidemia, no current medication regimen  Recheck lipid panel   - Lipid panel; Future    5  Thyroid disorder screen  - TSH, 3rd generation with Free T4 reflex; Future    6  Screening for disorder of blood and blood-forming organs  - CBC and differential; Future  - Comprehensive metabolic panel; Future      Subjective:      Patient ID: Pinky Clinton is a 78 y o  female  78year old female presents to establish care  She was previously seeing Dr Mirlande Chan  She is working part time at [employer]  She is undergoing caregiver stress because of taking care of her significant other who is battling cancer  She is also doing a lot at home with caring for him, taking care of the dog, doing all of the house responsibilities  Past medical history includes but is not limited to hyperlipidemia, osteopenia, GERD, insomnia  She is up-to-date with her cervical cancer screening, mammogram, DEXA scan all of which were performed at [facility]  Colonoscopy also up-to-date  She is agreeable to fasting blood work    She offers no "concerns or complaints at this time  The following portions of the patient's history were reviewed and updated as appropriate: allergies, current medications, past family history, past medical history, past surgical history and problem list     Review of Systems   Constitutional: Negative  HENT: Negative  Eyes: Negative  Respiratory: Negative  Cardiovascular: Negative  Gastrointestinal: Negative  Endocrine: Negative  Genitourinary: Negative  Musculoskeletal: Negative  Skin: Negative  Allergic/Immunologic: Negative  Neurological: Negative  Hematological: Negative  Psychiatric/Behavioral: Negative  Objective:      /76 (BP Location: Right arm, Patient Position: Sitting, Cuff Size: Large)   Pulse 88   Ht 5' 5\" (1 651 m)   Wt 69 kg (152 lb 3 2 oz)   SpO2 98%   BMI 25 33 kg/m²          Physical Exam  Vitals and nursing note reviewed  Constitutional:       General: She is not in acute distress  Appearance: Normal appearance  She is normal weight  She is not ill-appearing  HENT:      Head: Normocephalic and atraumatic  Eyes:      Conjunctiva/sclera: Conjunctivae normal       Pupils: Pupils are equal, round, and reactive to light  Cardiovascular:      Rate and Rhythm: Normal rate and regular rhythm  Pulses: Normal pulses  Heart sounds: Normal heart sounds  No murmur heard  Pulmonary:      Effort: Pulmonary effort is normal       Breath sounds: Normal breath sounds  Abdominal:      General: Abdomen is flat  Bowel sounds are normal       Palpations: Abdomen is soft  Tenderness: There is no abdominal tenderness  Musculoskeletal:         General: Normal range of motion  Cervical back: Normal range of motion and neck supple  Skin:     General: Skin is warm and dry  Neurological:      General: No focal deficit present  Mental Status: She is alert and oriented to person, place, and time  Mental status is at baseline   " Psychiatric:         Mood and Affect: Mood normal          Behavior: Behavior normal          Thought Content: Thought content normal            BMI Counseling: Body mass index is 25 33 kg/m²  The BMI is above normal  Nutrition recommendations include encouraging healthy choices of fruits and vegetables and increasing intake of lean protein  Exercise recommendations include exercising 3-5 times per week  Rationale for BMI follow-up plan is due to patient being overweight or obese  Depression Screening and Follow-up Plan: Patient was screened for depression during today's encounter  They screened negative with a PHQ-2 score of 2              CELSO Garnica

## 2023-06-23 ENCOUNTER — TELEPHONE (OUTPATIENT)
Dept: ADMINISTRATIVE | Facility: OTHER | Age: 68
End: 2023-06-23

## 2023-06-23 NOTE — TELEPHONE ENCOUNTER
----- Message from Randall Davidson sent at 6/22/2023  3:30 PM EDT -----  Regarding: DEXA scan, mammogram and Pap  06/22/23 3:30 PM    Hello, our patient Monica Muniz has had DEXA Scan, Mammogram, and Pap Smear (HPV) aka Cervical Cancer Screening completed/performed  Please assist in updating the patient chart by pulling the Care Everywhere (CE) document  The date of service with in the last year       Thank you,  Randall Davidson  [facility]

## 2023-06-26 NOTE — TELEPHONE ENCOUNTER
Upon review of the In Basket request we were able to locate, review, and update the patient chart as requested for DEXA Scan, Mammogram and Pap Smear (HPV) aka Cervical Cancer Screening  Any additional questions or concerns should be emailed to the Practice Liaisons via the appropriate education email address, please do not reply via In Basket      Thank you  Jacob Buchanan

## 2024-04-25 DIAGNOSIS — Z00.6 ENCOUNTER FOR EXAMINATION FOR NORMAL COMPARISON OR CONTROL IN CLINICAL RESEARCH PROGRAM: ICD-10-CM

## 2024-05-07 ENCOUNTER — APPOINTMENT (OUTPATIENT)
Dept: LAB | Facility: CLINIC | Age: 69
End: 2024-05-07
Payer: COMMERCIAL

## 2024-05-07 DIAGNOSIS — Z11.59 NEED FOR HEPATITIS C SCREENING TEST: ICD-10-CM

## 2024-05-07 DIAGNOSIS — Z13.29 THYROID DISORDER SCREEN: ICD-10-CM

## 2024-05-07 DIAGNOSIS — E78.5 HYPERLIPIDEMIA, UNSPECIFIED HYPERLIPIDEMIA TYPE: ICD-10-CM

## 2024-05-07 DIAGNOSIS — Z00.6 ENCOUNTER FOR EXAMINATION FOR NORMAL COMPARISON OR CONTROL IN CLINICAL RESEARCH PROGRAM: ICD-10-CM

## 2024-05-07 DIAGNOSIS — Z13.0 SCREENING FOR DISORDER OF BLOOD AND BLOOD-FORMING ORGANS: ICD-10-CM

## 2024-05-07 LAB
ALBUMIN SERPL BCP-MCNC: 4.3 G/DL (ref 3.5–5)
ALP SERPL-CCNC: 57 U/L (ref 34–104)
ALT SERPL W P-5'-P-CCNC: 8 U/L (ref 7–52)
ANION GAP SERPL CALCULATED.3IONS-SCNC: 8 MMOL/L (ref 4–13)
AST SERPL W P-5'-P-CCNC: 21 U/L (ref 13–39)
BASOPHILS # BLD AUTO: 0.08 THOUSANDS/ÂΜL (ref 0–0.1)
BASOPHILS NFR BLD AUTO: 2 % (ref 0–1)
BILIRUB SERPL-MCNC: 0.52 MG/DL (ref 0.2–1)
BUN SERPL-MCNC: 17 MG/DL (ref 5–25)
CALCIUM SERPL-MCNC: 9.2 MG/DL (ref 8.4–10.2)
CHLORIDE SERPL-SCNC: 102 MMOL/L (ref 96–108)
CHOLEST SERPL-MCNC: 258 MG/DL
CO2 SERPL-SCNC: 29 MMOL/L (ref 21–32)
CREAT SERPL-MCNC: 0.77 MG/DL (ref 0.6–1.3)
EOSINOPHIL # BLD AUTO: 0.25 THOUSAND/ÂΜL (ref 0–0.61)
EOSINOPHIL NFR BLD AUTO: 6 % (ref 0–6)
ERYTHROCYTE [DISTWIDTH] IN BLOOD BY AUTOMATED COUNT: 12.9 % (ref 11.6–15.1)
GFR SERPL CREATININE-BSD FRML MDRD: 80 ML/MIN/1.73SQ M
GLUCOSE P FAST SERPL-MCNC: 78 MG/DL (ref 65–99)
HCT VFR BLD AUTO: 43 % (ref 34.8–46.1)
HDLC SERPL-MCNC: 91 MG/DL
HGB BLD-MCNC: 14.1 G/DL (ref 11.5–15.4)
IMM GRANULOCYTES # BLD AUTO: 0.01 THOUSAND/UL (ref 0–0.2)
IMM GRANULOCYTES NFR BLD AUTO: 0 % (ref 0–2)
LDLC SERPL CALC-MCNC: 153 MG/DL (ref 0–100)
LYMPHOCYTES # BLD AUTO: 1.1 THOUSANDS/ÂΜL (ref 0.6–4.47)
LYMPHOCYTES NFR BLD AUTO: 24 % (ref 14–44)
MCH RBC QN AUTO: 31.1 PG (ref 26.8–34.3)
MCHC RBC AUTO-ENTMCNC: 32.8 G/DL (ref 31.4–37.4)
MCV RBC AUTO: 95 FL (ref 82–98)
MONOCYTES # BLD AUTO: 0.3 THOUSAND/ÂΜL (ref 0.17–1.22)
MONOCYTES NFR BLD AUTO: 7 % (ref 4–12)
NEUTROPHILS # BLD AUTO: 2.81 THOUSANDS/ÂΜL (ref 1.85–7.62)
NEUTS SEG NFR BLD AUTO: 61 % (ref 43–75)
NONHDLC SERPL-MCNC: 167 MG/DL
NRBC BLD AUTO-RTO: 0 /100 WBCS
PLATELET # BLD AUTO: 309 THOUSANDS/UL (ref 149–390)
PMV BLD AUTO: 9.8 FL (ref 8.9–12.7)
POTASSIUM SERPL-SCNC: 4.2 MMOL/L (ref 3.5–5.3)
PROT SERPL-MCNC: 6.9 G/DL (ref 6.4–8.4)
RBC # BLD AUTO: 4.54 MILLION/UL (ref 3.81–5.12)
SODIUM SERPL-SCNC: 139 MMOL/L (ref 135–147)
TRIGL SERPL-MCNC: 70 MG/DL
TSH SERPL DL<=0.05 MIU/L-ACNC: 1.89 UIU/ML (ref 0.45–4.5)
WBC # BLD AUTO: 4.55 THOUSAND/UL (ref 4.31–10.16)

## 2024-05-07 PROCEDURE — 85025 COMPLETE CBC W/AUTO DIFF WBC: CPT

## 2024-05-07 PROCEDURE — 86803 HEPATITIS C AB TEST: CPT

## 2024-05-07 PROCEDURE — 84443 ASSAY THYROID STIM HORMONE: CPT

## 2024-05-07 PROCEDURE — 80061 LIPID PANEL: CPT

## 2024-05-07 PROCEDURE — 80053 COMPREHEN METABOLIC PANEL: CPT

## 2024-05-07 PROCEDURE — 36415 COLL VENOUS BLD VENIPUNCTURE: CPT

## 2024-05-08 LAB — HCV AB SER QL: NORMAL

## 2024-05-19 LAB
APOB+LDLR+PCSK9 GENE MUT ANL BLD/T: NOT DETECTED
BRCA1+BRCA2 DEL+DUP + FULL MUT ANL BLD/T: NOT DETECTED
MLH1+MSH2+MSH6+PMS2 GN DEL+DUP+FUL M: NOT DETECTED

## 2024-06-25 ENCOUNTER — OFFICE VISIT (OUTPATIENT)
Dept: FAMILY MEDICINE CLINIC | Facility: MEDICAL CENTER | Age: 69
End: 2024-06-25
Payer: COMMERCIAL

## 2024-06-25 VITALS
BODY MASS INDEX: 25.09 KG/M2 | TEMPERATURE: 99.1 F | HEART RATE: 80 BPM | RESPIRATION RATE: 14 BRPM | SYSTOLIC BLOOD PRESSURE: 112 MMHG | DIASTOLIC BLOOD PRESSURE: 72 MMHG | HEIGHT: 65 IN | OXYGEN SATURATION: 98 % | WEIGHT: 150.6 LBS

## 2024-06-25 DIAGNOSIS — Z78.0 OSTEOPENIA AFTER MENOPAUSE: ICD-10-CM

## 2024-06-25 DIAGNOSIS — Z00.00 MEDICARE ANNUAL WELLNESS VISIT, SUBSEQUENT: Primary | ICD-10-CM

## 2024-06-25 DIAGNOSIS — G47.00 INSOMNIA, UNSPECIFIED TYPE: ICD-10-CM

## 2024-06-25 DIAGNOSIS — F33.9 DEPRESSION, RECURRENT (HCC): ICD-10-CM

## 2024-06-25 DIAGNOSIS — M85.80 OSTEOPENIA AFTER MENOPAUSE: ICD-10-CM

## 2024-06-25 PROCEDURE — 99214 OFFICE O/P EST MOD 30 MIN: CPT

## 2024-06-25 PROCEDURE — G0439 PPPS, SUBSEQ VISIT: HCPCS

## 2024-06-25 RX ORDER — TRAZODONE HYDROCHLORIDE 50 MG/1
50 TABLET ORAL
Qty: 30 TABLET | Refills: 0 | Status: SHIPPED | OUTPATIENT
Start: 2024-06-25

## 2024-06-25 NOTE — PROGRESS NOTES
Ambulatory Visit  Name: Jennifer Robbins      : 1955      MRN: [id number]  Encounter Provider: CELSO Valentin  Encounter Date: 2024   Encounter department: [facility] [place]    Assessment & Plan   Mammogram up-to-date.  Colonoscopy up-to-date.  Advised about Shingrix vaccine.  DEXA scan order placed.  Start trazodone as discussed.  Return in 1 month for follow-up insomnia, sooner if needed.  1. Medicare annual wellness visit, subsequent  2. Depression, recurrent (HCC)  3. Osteopenia after menopause  -     DXA bone density spine hip and pelvis; Future; Expected date: 2024  4. Insomnia, unspecified type  -     traZODone (DESYREL) 50 mg tablet; Take 1 tablet (50 mg total) by mouth daily at bedtime       Preventive health issues were discussed with patient, and age appropriate screening tests were ordered as noted in patient's After Visit Summary. Personalized health advice and appropriate referrals for health education or preventive services given if needed, as noted in patient's After Visit Summary.    History of Present Illness   {Disappearing Hyperlinks I Encounters * My Last Note * Since Last Visit * History :47788}  68-year-old female presents for Medicare wellness visit.  She is doing well, she is still working in retail.  She lives at home with her  who is battling cancer and undergoing chemotherapy at this time.  She is having a difficult time with this as although he is independent, she does help care for him.  She tells me her life is not her own as she is unable to go anywhere or do anything with her  because he has trouble controlling his bowels.  She does have a history of depression, was previously on Zoloft many years ago during the time she was depressed and going through divorce.  However, she was able to wean off of this medication and is not currently on anything for her depression.  She reports difficulty falling and staying asleep ongoing for  many years.  She tells me she did try trazodone in the past approximately 3 years ago but does not remember why she stopped taking it.  She is willing to try trazodone again for sleep.  We did discuss depression, she would like to hold off on restarting SSRI medication at this time, would like to do start with the trazodone for now and will follow-up in 1 month.  She offers no other concerns or complaints at this time.       Patient Care Team:  CELSO Valentin as PCP - General (Nurse Practitioner)  MD Tristin Miranda MD as Endoscopist    Review of Systems   Constitutional: Negative.    HENT: Negative.     Eyes: Negative.    Respiratory: Negative.     Cardiovascular: Negative.    Gastrointestinal: Negative.    Endocrine: Negative.    Genitourinary: Negative.    Musculoskeletal: Negative.    Skin: Negative.    Allergic/Immunologic: Negative.    Neurological: Negative.    Hematological: Negative.    Psychiatric/Behavioral:  Positive for sleep disturbance. Negative for self-injury and suicidal ideas.      Medical History Reviewed by provider this encounter:  Tobacco  Allergies  Meds  Problems  Med Hx  Surg Hx  Fam Hx       Annual Wellness Visit Questionnaire   Jennifer is here for her Subsequent Wellness visit.     Health Risk Assessment:   Patient rates overall health as very good. Patient feels that their physical health rating is same. Patient is satisfied with their life. Eyesight was rated as slightly worse. Hearing was rated as same. Patient feels that their emotional and mental health rating is slightly worse. Patients states they are never, rarely angry. Patient states they are never, rarely unusually tired/fatigued. Pain experienced in the last 7 days has been some. Patient's pain rating has been 4/10. Patient states that she has experienced no weight loss or gain in last 6 months.     Depression Screening:   PHQ-2 Score: 4  PHQ-9 Score: 13      Fall Risk Screening:   In the past  year, patient has experienced: history of falling in past year    Number of falls: 2 or more  Injured during fall?: No    Feels unsteady when standing or walking?: No    Worried about falling?: No      Urinary Incontinence Screening:   Patient has leaked urine accidently in the last six months. Falls due to dog pulling at leash. No injury, no worries about falling.    Home Safety:  Patient does not have trouble with stairs inside or outside of their home. Patient has working smoke alarms and has working carbon monoxide detector. Home safety hazards include: none.     Nutrition:   Current diet is Regular and Low Cholesterol.     Medications:   Patient is currently taking over-the-counter supplements. OTC medications include: see medication list. Patient is able to manage medications.     Activities of Daily Living (ADLs)/Instrumental Activities of Daily Living (IADLs):   Walk and transfer into and out of bed and chair?: Yes  Dress and groom yourself?: Yes    Bathe or shower yourself?: Yes    Feed yourself? Yes  Do your laundry/housekeeping?: Yes  Manage your money, pay your bills and track your expenses?: Yes  Make your own meals?: Yes    Do your own shopping?: Yes    Previous Hospitalizations:   Any hospitalizations or ED visits within the last 12 months?: No      Advance Care Planning:   Living will: No    Durable POA for healthcare: Yes    Advanced directive: No      Comments: Blue packet provided.     Cognitive Screening:   Provider or family/friend/caregiver concerned regarding cognition?: No    PREVENTIVE SCREENINGS      Cardiovascular Screening:    General: Screening Not Indicated and History Lipid Disorder      Diabetes Screening:     General: Screening Current      Colorectal Cancer Screening:     General: Screening Current      Breast Cancer Screening:     General: Screening Current      Cervical Cancer Screening:    General: Screening Not Indicated      Abdominal Aortic Aneurysm (AAA) Screening:         General: Screening Not Indicated      Lung Cancer Screening:     General: Screening Not Indicated      Hepatitis C Screening:    General: Screening Current    Screening, Brief Intervention, and Referral to Treatment (SBIRT)    Screening  Typical number of drinks in a day: 1  Typical number of drinks in a week: 3  Interpretation: Low risk drinking behavior.    AUDIT-C Screenin) How often did you have a drink containing alcohol in the past year? monthly or less  2) How many drinks did you have on a typical day when you were drinking in the past year? 1 to 2  3) How often did you have 6 or more drinks on one occasion in the past year? never    AUDIT-C Score: 1  Interpretation: Score 0-2 (female): Negative screen for alcohol misuse    Single Item Drug Screening:  How often have you used an illegal drug (including marijuana) or a prescription medication for non-medical reasons in the past year? never    Single Item Drug Screen Score: 0  Interpretation: Negative screen for possible drug use disorder    Other Counseling Topics:   Car/seat belt/driving safety, skin self-exam, sunscreen and calcium and vitamin D intake and regular weightbearing exercise.     Social Determinants of Health     Food Insecurity: No Food Insecurity (2024)    Hunger Vital Sign    • Worried About Running Out of Food in the Last Year: Never true    • Ran Out of Food in the Last Year: Never true   Transportation Needs: No Transportation Needs (2024)    PRAPARE - Transportation    • Lack of Transportation (Medical): No    • Lack of Transportation (Non-Medical): No   Housing Stability: Low Risk  (2024)    Housing Stability Vital Sign    • Unable to Pay for Housing in the Last Year: No    • Number of Times Moved in the Last Year: 0    • Homeless in the Last Year: No   Utilities: Not At Risk (2024)    [facility] Utilities    • Threatened with loss of utilities: No     No results found.    Objective   {Disappearing Hyperlinks   Review  "Vitals * Enter New Vitals * Results Review * Labs * Imaging * Cardiology * Procedures * Lung Cancer Screening :12769}  /72 (BP Location: Left arm, Patient Position: Sitting, Cuff Size: Standard)   Pulse 80   Temp 99.1 °F (37.3 °C) (Temporal)   Resp 14   Ht 5' 5\" (1.651 m)   Wt 68.3 kg (150 lb 9.6 oz)   SpO2 98%   BMI 25.06 kg/m²     Physical Exam  Vitals and nursing note reviewed.   Constitutional:       General: She is not in acute distress.     Appearance: Normal appearance. She is not ill-appearing.   HENT:      Head: Normocephalic and atraumatic.   Cardiovascular:      Rate and Rhythm: Normal rate.   Pulmonary:      Effort: Pulmonary effort is normal.   Skin:     General: Skin is warm and dry.   Neurological:      General: No focal deficit present.      Mental Status: She is alert and oriented to person, place, and time. Mental status is at baseline.   Psychiatric:         Attention and Perception: Attention and perception normal.         Mood and Affect: Mood normal. Affect is tearful.         Speech: Speech normal.         Behavior: Behavior normal. Behavior is cooperative.         Thought Content: Thought content normal. Thought content does not include suicidal ideation. Thought content does not include suicidal plan.         Judgment: Judgment normal.         PHQ-2/9 Depression Screening    Little interest or pleasure in doing things: 2 - more than half the days  Feeling down, depressed, or hopeless: 2 - more than half the days  Trouble falling or staying asleep, or sleeping too much: 3 - nearly every day  Feeling tired or having little energy: 3 - nearly every day  Poor appetite or overeatin - more than half the days  Feeling bad about yourself - or that you are a failure or have let yourself or your family down: 0 - not at all  Trouble concentrating on things, such as reading the newspaper or watching television: 1 - several days  Moving or speaking so slowly that other people could have " noticed. Or the opposite - being so fidgety or restless that you have been moving around a lot more than usual: 0 - not at all  Thoughts that you would be better off dead, or of hurting yourself in some way: 0 - not at all  PHQ-2 Score: 4  PHQ-2 Interpretation: POSITIVE depression screen  PHQ-9 Score: 13  PHQ-9 Interpretation: Moderate depression        Depression Screening Follow-up Plan: Patient's depression screening was positive with a PHQ-2 score of 4. Their PHQ-9 score was 13. Patient assessed for underlying major depression. They have no active suicidal ideations. Brief counseling provided and recommend additional follow-up/re-evaluation next office visit.

## 2024-06-25 NOTE — PATIENT INSTRUCTIONS
Recommended doses for Vitamin D 1000 IU daily and Calcium 1200 mg daily.     Medicare Preventive Visit Patient Instructions  Thank you for completing your Welcome to Medicare Visit or Medicare Annual Wellness Visit today. Your next wellness visit will be due in one year (6/26/2025).  The screening/preventive services that you may require over the next 5-10 years are detailed below. Some tests may not apply to you based off risk factors and/or age. Screening tests ordered at today's visit but not completed yet may show as past due. Also, please note that scanned in results may not display below.  Preventive Screenings:  Service Recommendations Previous Testing/Comments   Colorectal Cancer Screening  * Colonoscopy    * Fecal Occult Blood Test (FOBT)/Fecal Immunochemical Test (FIT)  * Fecal DNA/Cologuard Test  * Flexible Sigmoidoscopy Age: 45-75 years old   Colonoscopy: every 10 years (may be performed more frequently if at higher risk)  OR  FOBT/FIT: every 1 year  OR  Cologuard: every 3 years  OR  Sigmoidoscopy: every 5 years  Screening may be recommended earlier than age 45 if at higher risk for colorectal cancer. Also, an individualized decision between you and your healthcare provider will decide whether screening between the ages of 76-85 would be appropriate. Colonoscopy: 05/04/2021  FOBT/FIT: Not on file  Cologuard: Not on file  Sigmoidoscopy: Not on file    Screening Current     Breast Cancer Screening Age: 40+ years old  Frequency: every 1-2 years  Not required if history of left and right mastectomy Mammogram: 03/01/2024    Screening Current   Cervical Cancer Screening Between the ages of 21-29, pap smear recommended once every 3 years.   Between the ages of 30-65, can perform pap smear with HPV co-testing every 5 years.   Recommendations may differ for women with a history of total hysterectomy, cervical cancer, or abnormal pap smears in past. Pap Smear: 03/21/2022    Screening Not Indicated   Hepatitis C  Screening Once for adults born between 1945 and 1965  More frequently in patients at high risk for Hepatitis C Hep C Antibody: 05/07/2024    Screening Current   Diabetes Screening 1-2 times per year if you're at risk for diabetes or have pre-diabetes Fasting glucose: 78 mg/dL (5/7/2024)  A1C: No results in last 5 years (No results in last 5 years)  Screening Current   Cholesterol Screening Once every 5 years if you don't have a lipid disorder. May order more often based on risk factors. Lipid panel: 05/07/2024    Screening Not Indicated  History Lipid Disorder     Other Preventive Screenings Covered by Medicare:  Abdominal Aortic Aneurysm (AAA) Screening: covered once if your at risk. You're considered to be at risk if you have a family history of AAA.  Lung Cancer Screening: covers low dose CT scan once per year if you meet all of the following conditions: (1) Age 55-77; (2) No signs or symptoms of lung cancer; (3) Current smoker or have quit smoking within the last 15 years; (4) You have a tobacco smoking history of at least 20 pack years (packs per day multiplied by number of years you smoked); (5) You get a written order from a healthcare provider.  Glaucoma Screening: covered annually if you're considered high risk: (1) You have diabetes OR (2) Family history of glaucoma OR (3)  aged 50 and older OR (4)  American aged 65 and older  Osteoporosis Screening: covered every 2 years if you meet one of the following conditions: (1) You're estrogen deficient and at risk for osteoporosis based off medical history and other findings; (2) Have a vertebral abnormality; (3) On glucocorticoid therapy for more than 3 months; (4) Have primary hyperparathyroidism; (5) On osteoporosis medications and need to assess response to drug therapy.   Last bone density test (DXA Scan): 12/15/2021.  HIV Screening: covered annually if you're between the age of 15-65. Also covered annually if you are younger than 15  and older than 65 with risk factors for HIV infection. For pregnant patients, it is covered up to 3 times per pregnancy.    Immunizations:  Immunization Recommendations   Influenza Vaccine Annual influenza vaccination during flu season is recommended for all persons aged >= 6 months who do not have contraindications   Pneumococcal Vaccine   * Pneumococcal conjugate vaccine = PCV13 (Prevnar 13), PCV15 (Vaxneuvance), PCV20 (Prevnar 20)  * Pneumococcal polysaccharide vaccine = PPSV23 (Pneumovax) Adults 19-65 yo with certain risk factors or if 65+ yo  If never received any pneumonia vaccine: recommend Prevnar 20 (PCV20)  Give PCV20 if previously received 1 dose of PCV13 or PPSV23   Hepatitis B Vaccine 3 dose series if at intermediate or high risk (ex: diabetes, end stage renal disease, liver disease)   Respiratory syncytial virus (RSV) Vaccine - COVERED BY MEDICARE PART D  * RSVPreF3 (Arexvy) CDC recommends that adults 60 years of age and older may receive a single dose of RSV vaccine using shared clinical decision-making (SCDM)   Tetanus (Td) Vaccine - COST NOT COVERED BY MEDICARE PART B Following completion of primary series, a booster dose should be given every 10 years to maintain immunity against tetanus. Td may also be given as tetanus wound prophylaxis.   Tdap Vaccine - COST NOT COVERED BY MEDICARE PART B Recommended at least once for all adults. For pregnant patients, recommended with each pregnancy.   Shingles Vaccine (Shingrix) - COST NOT COVERED BY MEDICARE PART B  2 shot series recommended in those 19 years and older who have or will have weakened immune systems or those 50 years and older     Health Maintenance Due:      Topic Date Due    DXA SCAN  12/15/2023    Breast Cancer Screening: Mammogram  03/01/2025    Colorectal Cancer Screening  05/04/2026    Cervical Cancer Screening  03/21/2027    Hepatitis C Screening  Completed     Immunizations Due:      Topic Date Due    Influenza Vaccine (Season Ended)  09/01/2024     Advance Directives   What are advance directives?  Advance directives are legal documents that state your wishes and plans for medical care. These plans are made ahead of time in case you lose your ability to make decisions for yourself. Advance directives can apply to any medical decision, such as the treatments you want, and if you want to donate organs.   What are the types of advance directives?  There are many types of advance directives, and each state has rules about how to use them. You may choose a combination of any of the following:  Living will:  This is a written record of the treatment you want. You can also choose which treatments you do not want, which to limit, and which to stop at a certain time. This includes surgery, medicine, IV fluid, and tube feedings.   Durable power of  for healthcare (DPAHC):  This is a written record that states who you want to make healthcare choices for you when you are unable to make them for yourself. This person, called a proxy, is usually a family member or a friend. You may choose more than 1 proxy.  Do not resuscitate (DNR) order:  A DNR order is used in case your heart stops beating or you stop breathing. It is a request not to have certain forms of treatment, such as CPR. A DNR order may be included in other types of advance directives.  Medical directive:  This covers the care that you want if you are in a coma, near death, or unable to make decisions for yourself. You can list the treatments you want for each condition. Treatment may include pain medicine, surgery, blood transfusions, dialysis, IV or tube feedings, and a ventilator (breathing machine).  Values history:  This document has questions about your views, beliefs, and how you feel and think about life. This information can help others choose the care that you would choose.  Why are advance directives important?  An advance directive helps you control your care. Although spoken  wishes may be used, it is better to have your wishes written down. Spoken wishes can be misunderstood, or not followed. Treatments may be given even if you do not want them. An advance directive may make it easier for your family to make difficult choices about your care.   Depression   Depression  is a medical condition that causes feelings of sadness or hopelessness that do not go away. Depression may cause you to lose interest in things you used to enjoy. These feelings may interfere with your daily life.  Call your local emergency number (911 in the ) if:   You think about harming yourself or someone else.  You have done something on purpose to hurt yourself.  The following resources are available at any time to help you, if needed:   National Suicide Prevention Lifeline: 1-773.764.8910 (9-931-280-TALK)   Suicide Hotline: 1-975.539.3718 (2-298-IQCUWWI)   For a list of international numbers: https://save.org/find-help/international-resources/  Treatment for depression may include medicine to relieve depression. Medicine is often used together with therapy. Therapy is a way for you to talk about your feelings and anything that may be causing depression. Therapy can be done alone or in a group. It may also be done with family members or a significant other.  Get regular physical activity.    Create a regular sleep schedule.    Eat a variety of healthy foods.    Do not drink alcohol or use drugs.     Fall Prevention    Fall prevention  includes ways to make your home and other areas safer. It also includes ways you can move more carefully to prevent a fall. Health conditions that cause changes in your blood pressure, vision, or muscle strength and coordination may increase your risk for falls. Medicines may also increase your risk for falls if they make you dizzy, weak, or sleepy.   Fall prevention tips:   Stand or sit up slowly.    Use assistive devices as directed.    Wear shoes that fit well and have soles that  .    Wear a personal alarm.    Stay active.    Manage your medical conditions.    Home Safety Tips:  Add items to prevent falls in the bathroom.    Keep paths clear.    Install bright lights in your home.    Keep items you use often on shelves within reach.    Paint or place reflective tape on the edges of your stairs.    Urinary Incontinence   Urinary incontinence (UI)  is when you lose control of your bladder. UI develops because your bladder cannot store or empty urine properly. The 3 most common types of UI are stress incontinence, urge incontinence, or both.  Medicines:   May be given to help strengthen your bladder control. Report any side effects of medication to your healthcare provider.  Do pelvic muscle exercises often:  Your pelvic muscles help you stop urinating. Squeeze these muscles tight for 5 seconds, then relax for 5 seconds. Gradually work up to squeezing for 10 seconds. Do 3 sets of 15 repetitions a day, or as directed. This will help strengthen your pelvic muscles and improve bladder control.  Train your bladder:  Go to the bathroom at set times, such as every 2 hours, even if you do not feel the urge to go. You can also try to hold your urine when you feel the urge to go. For example, hold your urine for 5 minutes when you feel the urge to go. As that becomes easier, hold your urine for 10 minutes.   Self-care:   Keep a UI record.  Write down how often you leak urine and how much you leak. Make a note of what you were doing when you leaked urine.  Drink liquids as directed. You may need to limit the amount of liquid you drink to help control your urine leakage. Do not drink any liquid right before you go to bed. Limit or do not have drinks that contain caffeine or alcohol.   Prevent constipation.  Eat a variety of high-fiber foods. Good examples are high-fiber cereals, beans, vegetables, and whole-grain breads. Walking is the best way to trigger your intestines to have a bowel  movement.  Exercise regularly and maintain a healthy weight.  Weight loss and exercise will decrease pressure on your bladder and help you control your leakage.   Use a catheter as directed  to help empty your bladder. A catheter is a tiny, plastic tube that is put into your bladder to drain your urine.   Go to behavior therapy as directed.  Behavior therapy may be used to help you learn to control your urge to urinate.    Weight Management   Why it is important to manage your weight:  Being overweight increases your risk of health conditions such as heart disease, high blood pressure, type 2 diabetes, and certain types of cancer. It can also increase your risk for osteoarthritis, sleep apnea, and other respiratory problems. Aim for a slow, steady weight loss. Even a small amount of weight loss can lower your risk of health problems.  How to lose weight safely:  A safe and healthy way to lose weight is to eat fewer calories and get regular exercise. You can lose up about 1 pound a week by decreasing the number of calories you eat by 500 calories each day.   Healthy meal plan for weight management:  A healthy meal plan includes a variety of foods, contains fewer calories, and helps you stay healthy. A healthy meal plan includes the following:  Eat whole-grain foods more often.  A healthy meal plan should contain fiber. Fiber is the part of grains, fruits, and vegetables that is not broken down by your body. Whole-grain foods are healthy and provide extra fiber in your diet. Some examples of whole-grain foods are whole-wheat breads and pastas, oatmeal, brown rice, and bulgur.  Eat a variety of vegetables every day.  Include dark, leafy greens such as spinach, kale, vitaliy greens, and mustard greens. Eat yellow and orange vegetables such as carrots, sweet potatoes, and winter squash.   Eat a variety of fruits every day.  Choose fresh or canned fruit (canned in its own juice or light syrup) instead of juice. Fruit  juice has very little or no fiber.  Eat low-fat dairy foods.  Drink fat-free (skim) milk or 1% milk. Eat fat-free yogurt and low-fat cottage cheese. Try low-fat cheeses such as mozzarella and other reduced-fat cheeses.  Choose meat and other protein foods that are low in fat.  Choose beans or other legumes such as split peas or lentils. Choose fish, skinless poultry (chicken or turkey), or lean cuts of red meat (beef or pork). Before you cook meat or poultry, cut off any visible fat.   Use less fat and oil.  Try baking foods instead of frying them. Add less fat, such as margarine, sour cream, regular salad dressing and mayonnaise to foods. Eat fewer high-fat foods. Some examples of high-fat foods include french fries, doughnuts, ice cream, and cakes.  Eat fewer sweets.  Limit foods and drinks that are high in sugar. This includes candy, cookies, regular soda, and sweetened drinks.  Exercise:  Exercise at least 30 minutes per day on most days of the week. Some examples of exercise include walking, biking, dancing, and swimming. You can also fit in more physical activity by taking the stairs instead of the elevator or parking farther away from stores. Ask your healthcare provider about the best exercise plan for you.      © Copyright [employer] 2018 Information is for End User's use only and may not be sold, redistributed or otherwise used for commercial purposes. All illustrations and images included in CareNotes® are the copyrighted property of [employer] or [employer]

## 2024-07-17 DIAGNOSIS — G47.00 INSOMNIA, UNSPECIFIED TYPE: ICD-10-CM

## 2024-07-17 RX ORDER — TRAZODONE HYDROCHLORIDE 50 MG/1
50 TABLET ORAL
Qty: 100 TABLET | Refills: 1 | Status: SHIPPED | OUTPATIENT
Start: 2024-07-17

## 2024-09-09 ENCOUNTER — TELEPHONE (OUTPATIENT)
Age: 69
End: 2024-09-09

## 2024-09-09 NOTE — TELEPHONE ENCOUNTER
I called patient left voice message to return my call to reschedule 9/27/24 appointment with Dr Rhodes.

## 2024-09-11 ENCOUNTER — HOSPITAL ENCOUNTER (OUTPATIENT)
Dept: RADIOLOGY | Facility: MEDICAL CENTER | Age: 69
Discharge: HOME | End: 2024-09-11
Payer: COMMERCIAL

## 2024-09-11 DIAGNOSIS — M85.80 OSTEOPENIA AFTER MENOPAUSE: ICD-10-CM

## 2024-09-11 DIAGNOSIS — Z78.0 OSTEOPENIA AFTER MENOPAUSE: ICD-10-CM

## 2024-09-11 PROCEDURE — 77080 DXA BONE DENSITY AXIAL: CPT

## 2024-10-01 ENCOUNTER — OFFICE VISIT (OUTPATIENT)
Age: 69
End: 2024-10-01
Payer: COMMERCIAL

## 2024-10-01 ENCOUNTER — PREP FOR PROCEDURE (OUTPATIENT)
Age: 69
End: 2024-10-01

## 2024-10-01 VITALS
DIASTOLIC BLOOD PRESSURE: 80 MMHG | HEART RATE: 88 BPM | BODY MASS INDEX: 24.49 KG/M2 | WEIGHT: 147 LBS | HEIGHT: 65 IN | SYSTOLIC BLOOD PRESSURE: 118 MMHG | OXYGEN SATURATION: 97 %

## 2024-10-01 DIAGNOSIS — R19.7 DIARRHEA, UNSPECIFIED TYPE: ICD-10-CM

## 2024-10-01 DIAGNOSIS — R10.30 LOWER ABDOMINAL PAIN: ICD-10-CM

## 2024-10-01 DIAGNOSIS — R19.4 CHANGE IN BOWEL HABITS: Primary | ICD-10-CM

## 2024-10-01 DIAGNOSIS — Z80.0 FAMILY HISTORY OF COLON CANCER: ICD-10-CM

## 2024-10-01 PROCEDURE — 99204 OFFICE O/P NEW MOD 45 MIN: CPT | Performed by: INTERNAL MEDICINE

## 2024-10-01 NOTE — PROGRESS NOTES
[facility]      Chief Complaint: Diarrhea    HPI:  Jennifer Robbins is a 69 y.o.  female who presents with recent change in bowel habits.  Over the last 4 to 5 months the patient has noticed significant increase in the number of stools.  She has lower abdominal cramping pain relieved with bowel movement.  She has up to 5 bowel movements during the day which is very loose but not watery.  No nocturnal symptomatology.  No melena hematochezia or rectal bleeding.  No recent weight loss.  No fever or chills.  No new medications.  She drinks a 16 ounce cup of coffee in the morning.  She drinks Lactaid because of issues with lactose but eats cheese regularly.  She has no other associated GI symptomatology.  Colonoscopy in 2021 was done for family history of colon cancer in a first-degree relative.  Diverticulosis was found.  5-year follow-up was given.  She has no other symptomatology at the present time..      Review of Systems:   Constitutional: No fever or chills, feels well, no tiredness, no recent weight gain or weight loss.   HENT: No complaints of earache, no hearing loss, no nosebleeds, no nasal discharge, no sore throat, no hoarseness.    Eyes: No complaints of eye pain, no red eyes, no discharge from eyes, no itchy eyes.  Cardiovascular: No complaints of slow heart rate, no fast heart rate, no chest pain, no palpitations, no leg claudication, no lower extremity edema.   Respiratory: No complaints of shortness of breath, no wheezing, no cough, no SOB on exertion, no orthopnea.   Gastrointestinal: As noted in HPI  Genitourinary: No complaints of dysuria, no incontinence, no hesitancy, no nocturia.   Musculoskeletal: No complaints of arthralgia, no myalgias, no joint swelling or stiffness, no limb pain or swelling.   Neurological: No complaints of headache, no confusion, no convulsions, no numbness or tingling, no dizziness or fainting, no limb weakness, no difficulty walking.    Skin: No  "complaints of skin rash or skin lesions, no itching, no skin wound, no dry skin.    Hematological/Lymphatic: No complaints of swollen glands, does not bleed easy.   Allergic/Immunologic: No immunocompromised state.  Endocrine:  No complaints of polyuria, no polydipsia.   Psychiatric/Behavioral: is not suicidal, no sleep disturbances, no anxiety or depression, no change in personality, no emotional problems.       Historical Information   Past Medical History:   Diagnosis Date    GERD (gastroesophageal reflux disease)     Hyperlipidemia     Insomnia      Past Surgical History:   Procedure Laterality Date    BREAST LUMPECTOMY      lumpectomy, resolved 1999    COLONOSCOPY      resolved 2008    VT ESOPHAGOGASTRODUODENOSCOPY TRANSORAL DIAGNOSTIC N/A 6/8/2017    Procedure: EGD AND COLONOSCOPY;  Surgeon: Tristin Rhodes MD;  Location: [facility];  Service: Gastroenterology     Social History   Social History     Substance and Sexual Activity   Alcohol Use Not Currently    Alcohol/week: 1.0 standard drink of alcohol    Types: 1 Glasses of wine per week    Comment: one glass a day     Social History     Substance and Sexual Activity   Drug Use No     Social History     Tobacco Use   Smoking Status Never   Smokeless Tobacco Never     Family History   Problem Relation Age of Onset    Parkinsonism Mother     Diabetes Father         DM    Cancer Father         urinary bladder    Hypertension Father          Current Medications: has a current medication list which includes the following prescription(s): calcium carb-cholecalciferol, trazodone, turmeric, vitamin b-12, and pyridoxine.        Vital Signs: /80   Pulse 88   Ht 5' 5\" (1.651 m)   Wt 66.7 kg (147 lb)   SpO2 97%   BMI 24.46 kg/m²       Physical Exam:   Constitutional  General Appearance: No acute distress, well appearing and well nourished  Head  Normocephalic  Eyes  Conjunctivae and lids: No swelling, erythema, or discharge.    Pupils and irises: Equal, " round and reactive to light.   Ears, Nose, Mouth, and Throat  External inspection of ears and nose: Normal  Nasal mucosa, septum and turbinates: Normal without edema or erythema/   Oropharynx: Normal with no erythema, edema, exudate or lesions.   Neck  Normal range of motion. Neck supple.   Cardiovascular  Auscultation of the heart: Normal rate and rhythm, normal S1 and S2 without murmurs.  Examination of the extremities for edema and/or varicosities: Normal  Pulmonary/Chest  Respiratory effort: No increased work of breathing or signs of respiratory distress.   Auscultation of lungs: Clear to auscultation, equal breath sounds bilaterally, no wheezes, rales, no rhonchi.   Abdomen  Abdomen: Non-tender, no masses.   Liver and spleen: No hepatomegaly or splenomegaly.   Musculoskeletal  Gait and station: normal.  Digits and Nails: normal without clubbing or cyanosis.  Inspection/palpation of joints, bones, and muscles: Normal  Neurological  No nystagmus or asterixis.   Skin  Skin and subcutaneous tissue: Normal without rashes or lesions.   Lymphatic  Palpation of the lymph nodes in neck: No lymphadenopathy.   Psychiatric  Orientation to person, place and time: Normal.  Mood and affect: Normal.         Labs:  Lab Results   Component Value Date    ALT 8 05/07/2024    AST 21 05/07/2024    BUN 17 05/07/2024    CALCIUM 9.2 05/07/2024     05/07/2024    CO2 29 05/07/2024    CREATININE 0.77 05/07/2024    HDL 91 05/07/2024    HCT 43.0 05/07/2024    HGB 14.1 05/07/2024     05/07/2024    K 4.2 05/07/2024    TRIG 70 05/07/2024    WBC 4.55 05/07/2024         X-Rays & Procedures:   No orders to display           ______________________________________________________________________      Assessment & Plan:     Diagnoses and all orders for this visit:    Change in bowel habits  -     Celiac Disease Panel; Future  -     Calprotectin,Fecal; Future  -     Fecal leukocytes; Future  -     Ova and parasite examination; Future  -      Stool Enteric Bacterial Panel by PCR; Future  -     Colonoscopy; Future    Diarrhea, unspecified type  -     Celiac Disease Panel; Future  -     Calprotectin,Fecal; Future  -     Fecal leukocytes; Future  -     Ova and parasite examination; Future  -     Stool Enteric Bacterial Panel by PCR; Future  -     Colonoscopy; Future    Lower abdominal pain  -     Celiac Disease Panel; Future  -     Calprotectin,Fecal; Future  -     Fecal leukocytes; Future  -     Ova and parasite examination; Future  -     Stool Enteric Bacterial Panel by PCR; Future  -     Colonoscopy; Future    Family history of colon cancer  -     Colonoscopy; Future      Patient is given a copy of a lactose-free diet and encouraged to follow it.  She is advised to cut her coffee back to a small cup in the morning  She will undergo repeat colonoscopy  We will obtain stool testing and celiac panel.  Further recommendations will depend on study results

## 2024-10-01 NOTE — PATIENT INSTRUCTIONS
Scheduled date of colonoscopy (as of today): 10/29/24  Physician performing colonoscopy: Meagan  Location of colonoscopy: [place]  Bowel prep reviewed with patient: Miralax  Instructions reviewed with patient by: Nayla OLIVA  Clearances:

## 2024-10-07 ENCOUNTER — APPOINTMENT (OUTPATIENT)
Dept: LAB | Facility: CLINIC | Age: 69
End: 2024-10-07
Payer: COMMERCIAL

## 2024-10-07 DIAGNOSIS — R19.4 CHANGE IN BOWEL HABITS: ICD-10-CM

## 2024-10-07 DIAGNOSIS — R19.7 DIARRHEA, UNSPECIFIED TYPE: ICD-10-CM

## 2024-10-07 DIAGNOSIS — R10.30 LOWER ABDOMINAL PAIN: ICD-10-CM

## 2024-10-07 PROCEDURE — 36415 COLL VENOUS BLD VENIPUNCTURE: CPT

## 2024-10-07 PROCEDURE — 86258 DGP ANTIBODY EACH IG CLASS: CPT

## 2024-10-07 PROCEDURE — 86364 TISS TRNSGLTMNASE EA IG CLAS: CPT

## 2024-10-07 PROCEDURE — 82784 ASSAY IGA/IGD/IGG/IGM EACH: CPT

## 2024-10-08 LAB — IGA SERPL-MCNC: 177 MG/DL (ref 66–433)

## 2024-10-09 LAB
GLIADIN PEPTIDE IGA SER-ACNC: <0.2 U/ML
GLIADIN PEPTIDE IGA SER-ACNC: NEGATIVE
GLIADIN PEPTIDE IGG SER-ACNC: <0.4 U/ML
GLIADIN PEPTIDE IGG SER-ACNC: NEGATIVE
TTG IGA SER-ACNC: <0.5 U/ML
TTG IGA SER-ACNC: NEGATIVE
TTG IGG SER-ACNC: <0.8 U/ML
TTG IGG SER-ACNC: NEGATIVE

## 2024-10-26 ENCOUNTER — TELEPHONE (OUTPATIENT)
Dept: OTHER | Facility: OTHER | Age: 69
End: 2024-10-26

## 2024-10-26 NOTE — TELEPHONE ENCOUNTER
Patient calling in today to cancel her colonoscopy procedure that is scheduled for Tuesday 10/29. Stated she would like the office to call her back to reschedule.

## 2024-10-28 NOTE — TELEPHONE ENCOUNTER
T called because she got a confirm text for this, I went ahead and cxl'ed it for her and she says she will call us when she is ready to R/S

## 2024-11-26 ENCOUNTER — APPOINTMENT (OUTPATIENT)
Dept: RADIOLOGY | Facility: MEDICAL CENTER | Age: 69
End: 2024-11-26
Payer: COMMERCIAL

## 2024-11-26 ENCOUNTER — OFFICE VISIT (OUTPATIENT)
Dept: FAMILY MEDICINE CLINIC | Facility: MEDICAL CENTER | Age: 69
End: 2024-11-26
Payer: COMMERCIAL

## 2024-11-26 VITALS
TEMPERATURE: 98.9 F | OXYGEN SATURATION: 99 % | WEIGHT: 149.4 LBS | BODY MASS INDEX: 24.89 KG/M2 | DIASTOLIC BLOOD PRESSURE: 90 MMHG | HEART RATE: 98 BPM | RESPIRATION RATE: 18 BRPM | HEIGHT: 65 IN | SYSTOLIC BLOOD PRESSURE: 122 MMHG

## 2024-11-26 DIAGNOSIS — S69.92XA INJURY OF LEFT HAND, INITIAL ENCOUNTER: Primary | ICD-10-CM

## 2024-11-26 DIAGNOSIS — S69.92XA INJURY OF LEFT HAND, INITIAL ENCOUNTER: ICD-10-CM

## 2024-11-26 PROCEDURE — G2211 COMPLEX E/M VISIT ADD ON: HCPCS | Performed by: STUDENT IN AN ORGANIZED HEALTH CARE EDUCATION/TRAINING PROGRAM

## 2024-11-26 PROCEDURE — 99213 OFFICE O/P EST LOW 20 MIN: CPT | Performed by: STUDENT IN AN ORGANIZED HEALTH CARE EDUCATION/TRAINING PROGRAM

## 2024-11-26 PROCEDURE — 73130 X-RAY EXAM OF HAND: CPT

## 2024-11-26 NOTE — PROGRESS NOTES
[facility] - Clinic Note  Melissa Noble DO, 25     Jennifer Robbins MRN: [id number] : 1955 Age: 69 y.o.     Assessment/Plan     1. Injury of left hand, initial encounter (Primary)    -Follow-up imaging  -Rest, ice, splint  -XR hand 3+ vw left; Future    Jennifer Robbins acknowledged understanding of treatment plan, all questions answered.    Subjective     Jennifer Robbins is a 69 y.o. female who presents for evaluation of left hand/finger pain.  She is a patient of [facility]. Onset was sudden, related to injury about 1 month ago.  Patient has 2 [place].  She had them both on leashes and one of them saw a rabbit and the leash wrapped around her hand and was yanked by her dog.  Patient experienced swelling. The pain is mild now, worsens with movement, and is relieved by rest. There is associated numbness in lateral aspect 5th digit and palmar aspect of hand. Evaluation to date: none. Treatment to date: ice.  Patient has noticed since the event her knuckles swell at times.      The following portions of the patient's history were reviewed and updated as appropriate: allergies, current medications, past family history, past medical history, past social history, past surgical history and problem list.     Past Medical History:   Diagnosis Date    GERD (gastroesophageal reflux disease)     Hyperlipidemia     Insomnia        Allergies   Allergen Reactions    Penicillins Rash     Pt. Not sure what allergy is.       Past Surgical History:   Procedure Laterality Date    BREAST LUMPECTOMY      lumpectomy, resolved     COLONOSCOPY      resolved     VA ESOPHAGOGASTRODUODENOSCOPY TRANSORAL DIAGNOSTIC N/A 2017    Procedure: EGD AND COLONOSCOPY;  Surgeon: Tristin Rhodes MD;  Location: [facility];  Service: Gastroenterology       Family History   Problem Relation Age of Onset    Parkinsonism Mother     Diabetes Father         DM    Cancer Father         urinary bladder     Hypertension Father        Social History     Socioeconomic History    Marital status:      Spouse name: None    Number of children: None    Years of education: None    Highest education level: None   Occupational History    None   Tobacco Use    Smoking status: Never    Smokeless tobacco: Never   Vaping Use    Vaping status: Never Used   Substance and Sexual Activity    Alcohol use: Not Currently     Alcohol/week: 1.0 standard drink of alcohol     Types: 1 Glasses of wine per week     Comment: one glass a day    Drug use: No    Sexual activity: None   Other Topics Concern    None   Social History Narrative    Caffeine use     Social Drivers of Health     Financial Resource Strain: Not on file   Food Insecurity: No Food Insecurity (6/22/2024)    Nursing - Inadequate Food Risk Classification     Worried About Running Out of Food in the Last Year: Never true     Ran Out of Food in the Last Year: Never true     Ran Out of Food in the Last Year: Not on file   Transportation Needs: No Transportation Needs (6/22/2024)    PRAPARE - Transportation     Lack of Transportation (Medical): No     Lack of Transportation (Non-Medical): No   Physical Activity: Not on file   Stress: Not on file   Social Connections: Unknown (6/18/2024)    Received from [employer]    Social [employer]     How often do you feel lonely or isolated from those around you? (Adult - for ages 18 years and over): Not on file   Intimate Partner Violence: Not on file   Housing Stability: Low Risk  (6/22/2024)    Housing Stability Vital Sign     Unable to Pay for Housing in the Last Year: No     Number of Times Moved in the Last Year: 0     Homeless in the Last Year: No       Current Outpatient Medications   Medication Sig Dispense Refill    Calcium Carb-Cholecalciferol (OSCAL-D) 500 mg-200 units per tablet Take 1 tablet by mouth 2 (two) times a day with meals      Turmeric 500 MG CAPS Take by mouth      vitamin B-12 (CYANOCOBALAMIN) 100 MCG tablet Take  "100 mcg by mouth daily      pyridoxine (VITAMIN B6) 100 mg tablet Take 100 mg by mouth daily (Patient not taking: Reported on 11/26/2024)      traZODone (DESYREL) 50 mg tablet TAKE 1 TABLET BY MOUTH DAILY AT BEDTIME (Patient not taking: Reported on 11/26/2024) 100 tablet 1     No current facility-administered medications for this visit.       Review of Systems     As noted in HPI    Objective      /90 (BP Location: Left arm, Patient Position: Sitting, Cuff Size: Standard)   Pulse 98   Temp 98.9 °F (37.2 °C) (Temporal)   Resp 18   Ht 5' 5\" (1.651 m)   Wt 67.8 kg (149 lb 6.4 oz)   SpO2 99%   BMI 24.86 kg/m²     Physical Exam  Vitals reviewed.   Constitutional:       General: She is not in acute distress.     Appearance: Normal appearance.   Eyes:      Conjunctiva/sclera: Conjunctivae normal.   Musculoskeletal:      Right hand: Normal.      Left hand: Tenderness and bony tenderness present. No swelling or deformity. Normal range of motion. Normal strength. Normal sensation. Normal capillary refill. Normal pulse.      Comments: Tenderness fourth digit MCP   Skin:     General: Skin is warm and dry.   Neurological:      Mental Status: She is alert and oriented to person, place, and time.   Psychiatric:         Mood and Affect: Mood normal.         Behavior: Behavior normal.         Thought Content: Thought content normal.             Some portions of this record may have been generated with voice recognition software. There may be translation, syntax, or grammatical errors. Occasional wrong word or \"sound-a-like\" substitutions may have occurred due to the inherent limitations of the voice recognition software. Read the chart carefully and recognize, using context, where substations may have occurred. If you have any questions, please contact the dictating provider for clarification or correction, as needed.  "

## 2024-11-27 ENCOUNTER — TELEPHONE (OUTPATIENT)
Age: 69
End: 2024-11-27

## 2024-11-27 ENCOUNTER — RESULTS FOLLOW-UP (OUTPATIENT)
Dept: FAMILY MEDICINE CLINIC | Facility: MEDICAL CENTER | Age: 69
End: 2024-11-27

## 2024-11-27 DIAGNOSIS — S62.309A CLOSED FRACTURE OF METACARPAL BONE, UNSPECIFIED FRACTURE MORPHOLOGY, UNSPECIFIED METACARPAL, UNSPECIFIED PORTION OF METACARPAL, INITIAL ENCOUNTER: Primary | ICD-10-CM

## 2024-11-27 NOTE — TELEPHONE ENCOUNTER
Hello,    Please advise if a forced appointment can be accommodated for the patient:    Call back #: [phone]    Insurance: Medicare    Reason for appointment: Closed fracture of metacarpal bone, left    Requested doctor and/or location: [facility]      Thank you.